# Patient Record
Sex: MALE | Race: WHITE | NOT HISPANIC OR LATINO | Employment: OTHER | URBAN - METROPOLITAN AREA
[De-identification: names, ages, dates, MRNs, and addresses within clinical notes are randomized per-mention and may not be internally consistent; named-entity substitution may affect disease eponyms.]

---

## 2018-05-11 ENCOUNTER — OFFICE VISIT (OUTPATIENT)
Dept: FAMILY MEDICINE | Facility: CLINIC | Age: 58
End: 2018-05-11
Payer: COMMERCIAL

## 2018-05-11 VITALS
SYSTOLIC BLOOD PRESSURE: 160 MMHG | BODY MASS INDEX: 28.41 KG/M2 | OXYGEN SATURATION: 99 % | HEART RATE: 69 BPM | TEMPERATURE: 97 F | WEIGHT: 238 LBS | DIASTOLIC BLOOD PRESSURE: 112 MMHG

## 2018-05-11 DIAGNOSIS — I10 BENIGN ESSENTIAL HYPERTENSION: Primary | ICD-10-CM

## 2018-05-11 DIAGNOSIS — J06.9 UPPER RESPIRATORY TRACT INFECTION, UNSPECIFIED TYPE: ICD-10-CM

## 2018-05-11 PROCEDURE — 99203 OFFICE O/P NEW LOW 30 MIN: CPT | Performed by: INTERNAL MEDICINE

## 2018-05-11 RX ORDER — HYDROCHLOROTHIAZIDE 25 MG/1
25 TABLET ORAL DAILY
Qty: 30 TABLET | Refills: 1 | Status: SHIPPED | OUTPATIENT
Start: 2018-05-11 | End: 2018-06-11

## 2018-05-11 NOTE — MR AVS SNAPSHOT
"              After Visit Summary   5/11/2018    Cristian Antonio    MRN: [id number]           Patient Information     Date Of Birth          1960        Visit Information        Provider Department      5/11/2018 2:00 PM Slime Rausch MD; [facility]        Today's Diagnoses     Benign essential hypertension    -  1      Care Instructions    Start hydrochlorothiazide once daily.      I will see you in one month.      Please get fasting labs checked a few days before the visit with me.           Follow-ups after your visit        Follow-up notes from your care team     Return in about 1 month (around 6/11/2018) for Hypertension.      Who to contact     If you have questions or need follow up information about today's clinic visit or your schedule please contact [facility] [place] directly at [phone].  Normal or non-critical lab and imaging results will be communicated to you by MyChart, letter or phone within 4 business days after the clinic has received the results. If you do not hear from us within 7 days, please contact the clinic through MyChart or phone. If you have a critical or abnormal lab result, we will notify you by phone as soon as possible.  Submit refill requests through SDI or call your pharmacy and they will forward the refill request to us. Please allow 3 business days for your refill to be completed.          Additional Information About Your Visit        MyChart Information     SDI lets you send messages to your doctor, view your test results, renew your prescriptions, schedule appointments and more. To sign up, go to [email or web address] . Click on \"Log in\" on the left side of the screen, which will take you to the Welcome page. Then click on \"Sign up Now\" on the right side of the page.     You will be asked to enter the access code listed below, as well as some personal information. Please follow the directions to create your " username and password.     Your access code is: [email or web address]  Expires: 2018  2:16 PM     Your access code will  in 90 days. If you need help or a new code, please call your [place] clinic or [phone].        Care EveryWhere ID     This is your Care EveryWhere ID. This could be used by other organizations to access your [place] medical records  [id number]        Your Vitals Were     Pulse Temperature Pulse Oximetry BMI (Body Mass Index)          69 97  F (36.1  C) (Tympanic) 99% 28.41 kg/m2         Blood Pressure from Last 3 Encounters:   18 (!) 180/112   16 (!) 154/94   14 134/85    Weight from Last 3 Encounters:   18 238 lb (108 kg)   16 245 lb (111.1 kg)   14 246 lb (111.6 kg)              Today, you had the following     No orders found for display         Today's Medication Changes          These changes are accurate as of 18  2:16 PM.  If you have any questions, ask your nurse or doctor.               Start taking these medicines.        Dose/Directions    hydrochlorothiazide 25 MG tablet   Commonly known as:  HYDRODIURIL   Used for:  Benign essential hypertension   Started by:  Slime Rausch MD        Dose:  25 mg   Take 1 tablet (25 mg) by mouth daily   Quantity:  30 tablet   Refills:  1            Where to get your medicines      These medications were sent to [facility] [place] [place] [address]     Phone:  [phone]     hydrochlorothiazide 25 MG tablet                Primary Care Provider Office Phone # Fax #    Aristeo Gonzalez [phone]       [facility] [place] [address]        Equal Access to Services     JOURDAN VILLANUEVA AH: Brian chung Sogirish, waaxda luqadaha, qaybta kaalmada adeegyada, malik josue. [facility] [phone].    ATENCIÓN: Si habla español, tiene a mendez disposición servicios  abdias de asistencia lingüística. Colt enriquez [phone].    We comply with applicable federal civil rights laws and [place] laws. We do not discriminate on the basis of race, color, national origin, age, disability, sex, sexual orientation, or gender identity.            Thank you!     Thank you for choosing [facility] [place]  for your care. Our goal is always to provide you with excellent care. Hearing back from our patients is one way we can continue to improve our services. Please take a few minutes to complete the written survey that you may receive in the mail after your visit with us. Thank you!             Your Updated Medication List - Protect others around you: Learn how to safely use, store and throw away your medicines at www.disposemymeds.org.          This list is accurate as of 5/11/18  2:16 PM.  Always use your most recent med list.                   Brand Name Dispense Instructions for use Diagnosis    hydrochlorothiazide 25 MG tablet    HYDRODIURIL    30 tablet    Take 1 tablet (25 mg) by mouth daily    Benign essential hypertension

## 2018-05-11 NOTE — PROGRESS NOTES
SUBJECTIVE:   Cristian Antonio is a 57 year old male who presents to clinic today for the following health issues:      Acute Illness   Acute illness concerns: cough   Onset:  A week     Fever: no    Chills/Sweats: no    Headache (location?): YES    Sinus Pressure:YES    Conjunctivitis:  no    Ear Pain: no    Rhinorrhea: YES    Congestion: YES    Sore Throat: no     Cough: YES    Wheeze: no    Decreased Appetite: no    Nausea: YES- once     Vomiting: no    Diarrhea:  no    Dysuria/Freq.: no    Fatigue/Achiness: no    Sick/Strep Exposure: YES- wife     Therapies Tried and outcome: juliana Foster is here at the advice of his wife, who I saw earlier today, for URI symptoms.  He reports a nasty cough a week ago but it is much better now.  He has some sinus congestion and rhinorrhea in the morning, but that improves throughout the day.  He feels like overall his illnesses on the mend.      His blood pressure is markedly elevated today.  He has not been in for regular checkup in a while.  He does not check his blood pressure at the grocery store etc.  He denies any chest pain, headaches, palpitations or shortness of breath.  He does snore, but denies unrefreshing sleep.    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  He is otherwise healthy.        Reviewed and updated as needed this visit by clinical staff  Tobacco  Allergies  Meds       Reviewed and updated as needed this visit by Provider         ROS:  Constitutional, HEENT, cardiovascular, pulmonary, gi and gu systems are negative, except as otherwise noted.    OBJECTIVE:     BP (!) 160/112 (BP Location: Left arm, Patient Position: Chair, Cuff Size: Adult Regular)  Pulse 69  Temp 97  F (36.1  C) (Tympanic)  Wt 238 lb (108 kg)  SpO2 99%  BMI 28.41 kg/m2  Body mass index is 28.41 kg/(m^2).    Gen: well appearing, pleasant gentleman, no distress  HEENT: PERRL, no conjunctival injection, crowded oropharynx, no posterior pharynx erythema, MMM.  TM normal  b/l.     Neck: supple, no LAD  Pulm: breathing comfortably, CTAB, no wheezes or rales  CV: RRR, normal S1 and S2, no murmurs          ASSESSMENT/PLAN:       1. Benign essential hypertension  Cristian has very elevated blood pressure today.  There are several other elevated readings in the few times he has been here in the past 10 years.  I think it would be reasonable to start a medication today rather than have him back to repeat BP.  He is in agreement with this.  Reviewed common side effects, administration instructions.  Based on his exam and snoring, may need to consider AMY as contributing in the future.    - hydrochlorothiazide (HYDRODIURIL) 25 MG tablet; Take 1 tablet (25 mg) by mouth daily  Dispense: 30 tablet; Refill: 1    2. Upper respiratory tract infection, unspecified type  Exam is reassuring, he is feeling improved. Continue supportive care.        Follow up for HTN in one month.  I have asked him to get fasting labs a few days before the appointment so we can review them at the visit.       Slime Rausch MD  [facility]

## 2018-05-11 NOTE — PATIENT INSTRUCTIONS
Start hydrochlorothiazide once daily.      I will see you in one month.      Please get fasting labs checked a few days before the visit with me.

## 2018-06-11 ENCOUNTER — OFFICE VISIT (OUTPATIENT)
Dept: FAMILY MEDICINE | Facility: CLINIC | Age: 58
End: 2018-06-11
Payer: COMMERCIAL

## 2018-06-11 DIAGNOSIS — Z12.11 SCREEN FOR COLON CANCER: Primary | ICD-10-CM

## 2018-06-11 DIAGNOSIS — I10 BENIGN ESSENTIAL HYPERTENSION: ICD-10-CM

## 2018-06-11 PROCEDURE — 36415 COLL VENOUS BLD VENIPUNCTURE: CPT | Performed by: INTERNAL MEDICINE

## 2018-06-11 PROCEDURE — 80061 LIPID PANEL: CPT | Performed by: INTERNAL MEDICINE

## 2018-06-11 PROCEDURE — 99213 OFFICE O/P EST LOW 20 MIN: CPT | Performed by: INTERNAL MEDICINE

## 2018-06-11 PROCEDURE — 80048 BASIC METABOLIC PNL TOTAL CA: CPT | Performed by: INTERNAL MEDICINE

## 2018-06-11 RX ORDER — HYDROCHLOROTHIAZIDE 25 MG/1
25 TABLET ORAL DAILY
Qty: 90 TABLET | Refills: 1 | Status: SHIPPED | OUTPATIENT
Start: 2018-06-11 | End: 2019-01-07

## 2018-06-11 ASSESSMENT — MIFFLIN-ST. JEOR: SCORE: 1998.9

## 2018-06-11 NOTE — PROGRESS NOTES
SUBJECTIVE:   Cristian Antonio is a 57 year old male who presents to clinic today for the following health issues:      Hypertension Follow-up      Outpatient blood pressures are not being checked. Checked it at work once and top number was 119, but he thought that was probably a mistake     Low Salt Diet: no added salt      Amount of exercise or physical activity: 6-7 days/week for an average of 4-5 hrs     Problems taking medications regularly: No    Medication side effects: none    Diet: regular (no restrictions)    We started hctz a month ago for HTN.  He is tolerating this medication well without side effects, taking it consistently.  Denies CP, headaches, palpitations, SOB.          Reviewed and updated as needed this visit by clinical staff  Tobacco  Allergies  Meds  Med Hx  Surg Hx  Fam Hx  Soc Hx      Reviewed and updated as needed this visit by Provider  Tobacco  Med Hx  Surg Hx  Fam Hx  Soc Hx        ROS:  CV, pulm, neuro reviewed,  otherwise negative unless noted above.       OBJECTIVE:     BP (!) 126/94  Pulse 68  Temp 97.7  F (36.5  C) (Tympanic)  Wt 236 lb (107 kg)  BMI 28.17 kg/m2  Body mass index is 28.17 kg/(m^2).     Repeat 126/94    Gen: well appearing, pleasant gentleman, no distress  Pulm: breathing comfortably, CTAB, no wheezes or rales  CV: RRR, normal S1 and S2, no murmurs  Ext: no LE edema       ASSESSMENT/PLAN:       1. Benign essential hypertension  Systolic well controlled, diastolic is still high.  Recommended he monitor this at home, if diastolic consistently > 90 need to see him back sooner.  Will check lipids and BMP today, he is fasting   - hydrochlorothiazide (HYDRODIURIL) 25 MG tablet; Take 1 tablet (25 mg) by mouth daily  Dispense: 90 tablet; Refill: 1  - **Basic metabolic panel FUTURE anytime  - Lipid panel reflex to direct LDL Fasting      2. Screen for colon cancer  Discussed FIT test vs colonoscopy, he would like to proceed with colonoscopy   - GASTROENTEROLOGY  ADULT REF PROCEDURE ONLY      Follow up 3 months, sooner as noted above.     Slime Rausch MD  [facility]

## 2018-06-11 NOTE — MR AVS SNAPSHOT
After Visit Summary   6/11/2018    Cristian Antonio    MRN: [id number]           Patient Information     Date Of Birth          1960        Visit Information        Provider Department      6/11/2018 10:00 AM Slime Rausch MD; [facility] [place]        Today's Diagnoses     Screen for colon cancer    -  1    Benign essential hypertension          Care Instructions    Check your blood pressure a couple times per week.  The goal is < 120/80.      If the bottom number is consistently > 90, please come back sooner than 3 months.           Follow-ups after your visit        Additional Services     GASTROENTEROLOGY ADULT REF PROCEDURE ONLY       Last Lab Result: Creatinine (mg/dL)       Date                     Value                 01/31/2016               1.01             ----------  Body mass index is 28.17 kg/(m^2).     Needed:  Yes  Language:  American Sign Language    Patient will be contacted to schedule procedure.     Please be aware that coverage of these services is subject to the terms and limitations of your health insurance plan.  Call member services at your health plan with any benefit or coverage questions.  Any procedures must be performed at a [facility] facility OR coordinated by your clinic's referral office.    Please bring the following with you to your appointment:    (1) Any X-Rays, CTs or MRIs which have been performed.  Contact the facility where they were done to arrange for  prior to your scheduled appointment.    (2) List of current medications   (3) This referral request   (4) Any documents/labs given to you for this referral                  Follow-up notes from your care team     Return in about 3 months (around 9/11/2018) for Hypertension.      Who to contact     If you have questions or need follow up information about today's clinic visit or your schedule please contact [facility] [place] directly at  "[phone].  Normal or non-critical lab and imaging results will be communicated to you by MyChart, letter or phone within 4 business days after the clinic has received the results. If you do not hear from us within 7 days, please contact the clinic through Goodwallhart or phone. If you have a critical or abnormal lab result, we will notify you by phone as soon as possible.  Submit refill requests through [email or web address] or call your pharmacy and they will forward the refill request to us. Please allow 3 business days for your refill to be completed.          Additional Information About Your Visit        Goodwallhart Information     [email or web address] lets you send messages to your doctor, view your test results, renew your prescriptions, schedule appointments and more. To sign up, go to [email or web address] . Click on \"Log in\" on the left side of the screen, which will take you to the Welcome page. Then click on \"Sign up Now\" on the right side of the page.     You will be asked to enter the access code listed below, as well as some personal information. Please follow the directions to create your username and password.     Your access code is: [email or web address]  Expires: 2018  2:16 PM     Your access code will  in 90 days. If you need help or a new code, please call your [place] clinic or [phone].        Care EveryWhere ID     This is your Care EveryWhere ID. This could be used by other organizations to access your [place] medical records  [id number]        Your Vitals Were     Pulse Temperature BMI (Body Mass Index)             68 97.7  F (36.5  C) (Tympanic) 28.17 kg/m2          Blood Pressure from Last 3 Encounters:   18 140/90   18 (!) 160/112   16 (!) 154/94    Weight from Last 3 Encounters:   18 236 lb (107 kg)   18 238 lb (108 kg)   16 245 lb (111.1 kg)              We Performed the Following     GASTROENTEROLOGY ADULT REF PROCEDURE ONLY          Where to get your medicines    "   These medications were sent to [facility] [place] - [address]  [address], [place] [address]     Phone:  [phone]     hydrochlorothiazide 25 MG tablet          Primary Care Provider Office Phone # Fax #    Slime Rausch -[phone]       [address]  [place] [address]        Equal Access to Services     SUZY VILLANUEVA : Hadii aad ku hadasho Soomaali, waaxda luqadaha, qaybta kaalmada adeegyada, waxay idiin hayaan adeeg kharash la'gabino . So [facility] [phone].    ATENCIÓN: Si habla español, tiene a mendez disposición servicios gratuitos de asistencia lingüística. [facility] [phone].    We comply with applicable federal civil rights laws and [place] laws. We do not discriminate on the basis of race, color, national origin, age, disability, sex, sexual orientation, or gender identity.            Thank you!     Thank you for choosing [facility]  for your [place]. Our goal is always to provide you with excellent care. Hearing back from our patients is one way we can continue to improve our services. Please take a few minutes to complete the written survey that you may receive in the mail after your visit with us. Thank you!             Your Updated Medication List - Protect others around you: Learn how to safely use, store and throw away your medicines at www.disposemymeds.org.          This list is accurate as of 6/11/18 10:15 AM.  Always use your most recent med list.                   Brand Name Dispense Instructions for use Diagnosis    hydrochlorothiazide 25 MG tablet    HYDRODIURIL    90 tablet    Take 1 tablet (25 mg) by mouth daily    Benign essential hypertension

## 2018-06-11 NOTE — PATIENT INSTRUCTIONS
Check your blood pressure a couple times per week.  The goal is < 120/80.      If the bottom number is consistently > 90, please come back sooner than 3 months.

## 2018-06-11 NOTE — LETTER
June 13, 2018      Cristian Antonio  [address] [facility] [address]        Dear ,    We are writing to inform you of your test results.    Labs look good.  Your kidney function and electrolytes are normal.  Your blood sugar was in the normal range as well.  Cholesterol numbers look great, no need for any medications.    Resulted Orders   **Basic metabolic panel FUTURE anytime   Result Value Ref Range    Sodium 142 133 - 144 mmol/L    Potassium 3.8 3.4 - 5.3 mmol/L    Chloride 108 94 - 109 mmol/L    Carbon Dioxide 21 20 - 32 mmol/L    Anion Gap 13 3 - 14 mmol/L    Glucose 98 70 - 99 mg/dL      Comment:      Fasting specimen    Urea Nitrogen 18 7 - 30 mg/dL    Creatinine 0.99 0.66 - 1.25 mg/dL    GFR Estimate 77 >60 mL/min/1.7m2      Comment:      Non  GFR Calc    GFR Estimate If Black >90 >60 mL/min/1.7m2      Comment:       GFR Calc    Calcium 9.4 8.5 - 10.1 mg/dL   Lipid panel reflex to direct LDL Fasting   Result Value Ref Range    Cholesterol 140 <200 mg/dL    Triglycerides 59 <150 mg/dL      Comment:      Fasting specimen    HDL Cholesterol 45 >39 mg/dL    LDL Cholesterol Calculated 83 <100 mg/dL      Comment:      Desirable:       <100 mg/dl    Non HDL Cholesterol 95 <130 mg/dL       If you have any questions or concerns, please call the clinic at the number listed above.       Sincerely,        Slime Rausch MD

## 2018-06-13 LAB
ANION GAP SERPL CALCULATED.3IONS-SCNC: 13 MMOL/L (ref 3–14)
BUN SERPL-MCNC: 18 MG/DL (ref 7–30)
CALCIUM SERPL-MCNC: 9.4 MG/DL (ref 8.5–10.1)
CHLORIDE SERPL-SCNC: 108 MMOL/L (ref 94–109)
CHOLEST SERPL-MCNC: 140 MG/DL
CO2 SERPL-SCNC: 21 MMOL/L (ref 20–32)
CREAT SERPL-MCNC: 0.99 MG/DL (ref 0.66–1.25)
GFR SERPL CREATININE-BSD FRML MDRD: 77 ML/MIN/1.7M2
GLUCOSE SERPL-MCNC: 98 MG/DL (ref 70–99)
HDLC SERPL-MCNC: 45 MG/DL
LDLC SERPL CALC-MCNC: 83 MG/DL
NONHDLC SERPL-MCNC: 95 MG/DL
POTASSIUM SERPL-SCNC: 3.8 MMOL/L (ref 3.4–5.3)
SODIUM SERPL-SCNC: 142 MMOL/L (ref 133–144)
TRIGL SERPL-MCNC: 59 MG/DL

## 2019-01-07 DIAGNOSIS — I10 BENIGN ESSENTIAL HYPERTENSION: ICD-10-CM

## 2019-01-07 NOTE — TELEPHONE ENCOUNTER
"Requested Prescriptions   Pending Prescriptions Disp Refills     hydrochlorothiazide (HYDRODIURIL) 25 MG tablet [Pharmacy Med Name: HYDROCHLOROTHIAZIDE 25MG TABLETS] 30 tablet 0     Sig: TAKE 1 TABLET BY MOUTH DAILY    Diuretics (Including Combos) Protocol Failed - 1/7/2019 12:38 PM       Failed - Blood pressure under 140/90 in past 12 months    BP Readings from Last 3 Encounters:   06/11/18 (!) 126/94   05/11/18 (!) 160/112   01/31/16 (!) 154/94            Last Written Prescription Date:  6/11/2018  Last Fill Quantity: 90,  # refills: 1   Last office visit: 6/11/2018 with prescribing provider:  KELVIN Rausch  Future Office Visit:        Passed - Recent (12 mo) or future (30 days) visit within the authorizing provider's specialty    Patient had office visit in the last 12 months or has a visit in the next 30 days with authorizing provider or within the authorizing provider's specialty.  See \"Patient Info\" tab in inbasket, or \"Choose Columns\" in Meds & Orders section of the refill encounter.             Passed - Medication is active on med list       Passed - Patient is age 18 or older       Passed - Normal serum creatinine on file in past 12 months    Recent Labs   Lab Test 06/11/18  1017   CR 0.99             Passed - Normal serum potassium on file in past 12 months    Recent Labs   Lab Test 06/11/18  1017   POTASSIUM 3.8                   Passed - Normal serum sodium on file in past 12 months    Recent Labs   Lab Test 06/11/18  1017                   "

## 2019-01-07 NOTE — LETTER
[facility]  [address] [address]  [phone]        January 16, 2019  Cristian Antonio  [address] [place] [id number]    Dear Cristian,    I care about your health and have reviewed your health plan. I have reviewed your medical conditions, medication list, and lab results and am making recommendations based on this review, to better manage your health.    You are in particular need of attention regarding:  -High Blood Pressure    I am recommending that you:  Schedule an office visit for a BP check    Here is a list of Health Maintenance topics that are due now or due soon:  Health Maintenance Due   Topic Date Due     Depression Assessment 2 - yearly  06/26/1971     HIV SCREEN (SYSTEM ASSIGNED)  06/26/1977     Hepatitis C Screening  06/26/1977     Diptheria Tetanus Pertussis (DTAP/TDAP/TD) Vaccine (1 - Tdap) 06/26/1984     Colon Cancer Screening - every 10 years.  06/26/2009     Zoster (Chicken Pox) Vaccine (1 of 2) 06/26/2009     Discuss Advance Directive Planning  06/26/2014     Flu Vaccine (1) 09/01/2018       Please call us at [phone] (or use [employer]) to address the above recommendations.     Thank you for trusting [facility] and we appreciate the opportunity to serve you.  We look forward to supporting your healthcare needs in the future.    Healthy Regards,    Slime Rausch MD

## 2019-01-08 RX ORDER — HYDROCHLOROTHIAZIDE 25 MG/1
TABLET ORAL
Qty: 30 TABLET | Refills: 0 | Status: SHIPPED | OUTPATIENT
Start: 2019-01-08 | End: 2020-12-25

## 2019-06-25 VITALS
HEART RATE: 68 BPM | DIASTOLIC BLOOD PRESSURE: 94 MMHG | SYSTOLIC BLOOD PRESSURE: 126 MMHG | TEMPERATURE: 97.7 F | BODY MASS INDEX: 27.87 KG/M2 | HEIGHT: 77 IN | WEIGHT: 236 LBS

## 2020-12-25 ENCOUNTER — APPOINTMENT (OUTPATIENT)
Dept: GENERAL RADIOLOGY | Facility: CLINIC | Age: 60
End: 2020-12-25
Attending: EMERGENCY MEDICINE
Payer: COMMERCIAL

## 2020-12-25 ENCOUNTER — HOSPITAL ENCOUNTER (EMERGENCY)
Facility: CLINIC | Age: 60
Discharge: HOME | End: 2020-12-25
Attending: EMERGENCY MEDICINE | Admitting: EMERGENCY MEDICINE
Payer: COMMERCIAL

## 2020-12-25 VITALS
DIASTOLIC BLOOD PRESSURE: 113 MMHG | RESPIRATION RATE: 16 BRPM | TEMPERATURE: 99.6 F | OXYGEN SATURATION: 97 % | HEIGHT: 76 IN | WEIGHT: 245 LBS | HEART RATE: 76 BPM | BODY MASS INDEX: 29.83 KG/M2 | SYSTOLIC BLOOD PRESSURE: 153 MMHG

## 2020-12-25 DIAGNOSIS — J06.9 VIRAL UPPER RESPIRATORY TRACT INFECTION: ICD-10-CM

## 2020-12-25 DIAGNOSIS — Z20.822 SUSPECTED COVID-19 VIRUS INFECTION: ICD-10-CM

## 2020-12-25 LAB
ALBUMIN UR-MCNC: 30 MG/DL
ANION GAP SERPL CALCULATED.3IONS-SCNC: 6 MMOL/L (ref 3–14)
APPEARANCE UR: CLEAR
BACTERIA #/AREA URNS HPF: ABNORMAL /HPF
BASOPHILS # BLD AUTO: 0 10E9/L (ref 0–0.2)
BASOPHILS NFR BLD AUTO: 0.2 %
BILIRUB UR QL STRIP: NEGATIVE
BUN SERPL-MCNC: 16 MG/DL (ref 7–30)
CALCIUM SERPL-MCNC: 8.8 MG/DL (ref 8.5–10.1)
CHLORIDE SERPL-SCNC: 108 MMOL/L (ref 94–109)
CO2 SERPL-SCNC: 21 MMOL/L (ref 20–32)
COLOR UR AUTO: YELLOW
CREAT SERPL-MCNC: 1.07 MG/DL (ref 0.66–1.25)
DIFFERENTIAL METHOD BLD: NORMAL
EOSINOPHIL # BLD AUTO: 0 10E9/L (ref 0–0.7)
EOSINOPHIL NFR BLD AUTO: 0 %
ERYTHROCYTE [DISTWIDTH] IN BLOOD BY AUTOMATED COUNT: 12.3 % (ref 10–15)
FLUABV+SARS-COV-2+RSV PNL RESP NAA+PROBE: NEGATIVE
FLUABV+SARS-COV-2+RSV PNL RESP NAA+PROBE: NEGATIVE
GFR SERPL CREATININE-BSD FRML MDRD: 74 ML/MIN/{1.73_M2}
GLUCOSE SERPL-MCNC: 103 MG/DL (ref 70–99)
GLUCOSE UR STRIP-MCNC: NEGATIVE MG/DL
HCT VFR BLD AUTO: 48.4 % (ref 40–53)
HGB BLD-MCNC: 17.3 G/DL (ref 13.3–17.7)
HGB UR QL STRIP: ABNORMAL
HYALINE CASTS #/AREA URNS LPF: 7 /LPF (ref 0–2)
IMM GRANULOCYTES # BLD: 0 10E9/L (ref 0–0.4)
IMM GRANULOCYTES NFR BLD: 0.4 %
KETONES UR STRIP-MCNC: 40 MG/DL
LABORATORY COMMENT REPORT: ABNORMAL
LEUKOCYTE ESTERASE UR QL STRIP: NEGATIVE
LYMPHOCYTES # BLD AUTO: 0.8 10E9/L (ref 0.8–5.3)
LYMPHOCYTES NFR BLD AUTO: 17.2 %
MCH RBC QN AUTO: 29.6 PG (ref 26.5–33)
MCHC RBC AUTO-ENTMCNC: 35.7 G/DL (ref 31.5–36.5)
MCV RBC AUTO: 83 FL (ref 78–100)
MONOCYTES # BLD AUTO: 0.8 10E9/L (ref 0–1.3)
MONOCYTES NFR BLD AUTO: 15.9 %
MUCOUS THREADS #/AREA URNS LPF: PRESENT /LPF
NEUTROPHILS # BLD AUTO: 3.1 10E9/L (ref 1.6–8.3)
NEUTROPHILS NFR BLD AUTO: 66.3 %
NITRATE UR QL: NEGATIVE
NRBC # BLD AUTO: 0 10*3/UL
NRBC BLD AUTO-RTO: 0 /100
PH UR STRIP: 5.5 PH (ref 5–7)
PLATELET # BLD AUTO: 177 10E9/L (ref 150–450)
POTASSIUM SERPL-SCNC: 3.8 MMOL/L (ref 3.4–5.3)
RBC # BLD AUTO: 5.85 10E12/L (ref 4.4–5.9)
RBC #/AREA URNS AUTO: 1 /HPF (ref 0–2)
RSV RNA SPEC QL NAA+PROBE: ABNORMAL
SARS-COV-2 RNA SPEC QL NAA+PROBE: POSITIVE
SODIUM SERPL-SCNC: 135 MMOL/L (ref 133–144)
SOURCE: ABNORMAL
SP GR UR STRIP: 1.03 (ref 1–1.03)
SPECIMEN SOURCE: ABNORMAL
SQUAMOUS #/AREA URNS AUTO: <1 /HPF (ref 0–1)
UROBILINOGEN UR STRIP-MCNC: NORMAL MG/DL (ref 0–2)
WBC # BLD AUTO: 4.7 10E9/L (ref 4–11)
WBC #/AREA URNS AUTO: 2 /HPF (ref 0–5)

## 2020-12-25 PROCEDURE — 85025 COMPLETE CBC W/AUTO DIFF WBC: CPT | Performed by: EMERGENCY MEDICINE

## 2020-12-25 PROCEDURE — 81001 URINALYSIS AUTO W/SCOPE: CPT | Performed by: EMERGENCY MEDICINE

## 2020-12-25 PROCEDURE — 250N000013 HC RX MED GY IP 250 OP 250 PS 637: Performed by: EMERGENCY MEDICINE

## 2020-12-25 PROCEDURE — 71045 X-RAY EXAM CHEST 1 VIEW: CPT

## 2020-12-25 PROCEDURE — 99284 EMERGENCY DEPT VISIT MOD MDM: CPT | Mod: 25

## 2020-12-25 PROCEDURE — C9803 HOPD COVID-19 SPEC COLLECT: HCPCS

## 2020-12-25 PROCEDURE — 80048 BASIC METABOLIC PNL TOTAL CA: CPT | Performed by: EMERGENCY MEDICINE

## 2020-12-25 PROCEDURE — 87636 SARSCOV2 & INF A&B AMP PRB: CPT | Performed by: EMERGENCY MEDICINE

## 2020-12-25 RX ORDER — ACETAMINOPHEN 500 MG
1000 TABLET ORAL ONCE
Status: COMPLETED | OUTPATIENT
Start: 2020-12-25 | End: 2020-12-25

## 2020-12-25 RX ADMIN — ACETAMINOPHEN 1000 MG: 500 TABLET, FILM COATED ORAL at 11:30

## 2020-12-25 ASSESSMENT — MIFFLIN-ST. JEOR: SCORE: 2017.81

## 2020-12-25 NOTE — ED AVS SNAPSHOT
[facility] Emergency Dept  [address] [facility] [address]  Phone: [phone]  Fax: [phone]                                    Cristian Antonio   MRN: [id number]    Department: [facility] Emergency Dept   Date of Visit: 12/25/2020           After Visit Summary Signature Page    I have received my discharge instructions, and my questions have been answered. I have discussed any challenges I see with this plan with the nurse or doctor.    ..........................................................................................................................................  Patient/Patient Representative Signature      ..........................................................................................................................................  Patient Representative Print Name and Relationship to Patient    ..................................................               ................................................  Date                                   Time    ..........................................................................................................................................  Reviewed by Signature/Title    ...................................................              ..............................................  Date                                               Time          22EPIC Rev 08/18

## 2020-12-25 NOTE — ED TRIAGE NOTES
States felt fever/chills started last night (did not take temperature) - presents today feeling fatigue and generalized weakness.  Uses .

## 2020-12-25 NOTE — LETTER
December 26, 2020      Cristian Antonio  [address] [facility] [address]               SARS-CoV-2 PCR Result (no units)   Date Value   12/25/2020 POSITIVE (AA)       This letter provides a written record that you were tested for COVID-19. Your result was positive. This means you have COVID-19 (coronavirus).    How can I protect others?If you have symptoms, stay home and away from others (self-isolate) until:You ve had no fever--and no medicine that reduces fever--for 1 full day (24 hours). And      Your other symptoms have gotten better. For example, your cough or breathing has improved. And     At least 10 days have passed since your symptoms started. (If you've been told by a doctor that you have a weak immune system, wait 20 days).    If you don t have symptoms: Stay home and away from others (self-isolate) until at least 10 days have passed since your first positive COVID-19 test. If you have a weak immune system, please self-isolate for 20 days.    During this time:    Stay in your own room, including for meals. Use your own bathroom if you can.    Stay away from others in your home. No hugging, kissing or shaking hands. No visitors.     Don t go to work, school or anywhere else.     Clean  high touch  surfaces often (doorknobs, counters, handles, etc.). Use a household cleaning spray or wipes. You ll find a full list on the EPA website at www.epa.gov/pesticide-registration/list-n-disinfectants-use-against-sars-cov-2.     Cover your mouth and nose with a mask or other face covering to avoid spreading germs.    Wash your hands and face often with soap and water.    Caregivers in these groups are at risk for severe illness due to COVID-19:  o People 65 years and older  o People who live in a nursing home or long-term care facility  o People with chronic disease (lung, heart, cancer, diabetes, kidney, liver, immunologic)  o People who have a weakened immune system, including those who:  - Are in cancer  treatment  - Take medicine that weakens the immune system, such as corticosteroids  - Had a bone marrow or organ transplant  - Have an immune deficiency  - Have poorly controlled HIV or AIDS  - Are obese (body mass index of 40 or higher)  - Smoke regularly    Caregivers should wear gloves while washing dishes, handling laundry and cleaning bedrooms and bathrooms.    Wash and dry laundry with special caution. Don t shake dirty laundry, and use the warmest water setting you can.    If you have a weakened immune system, ask your doctor about other actions you should take.    For more tips, go to www.cdc.gov/coronavirus/2019-ncov/downloads/10Things.pdf.    You should not go back to work until you meet the guidelines above for ending your home isolation. You don't need to be retested for COVID-19 before going back to work- studies show that you won't spread the virus if it's been at least 10 days since your symptoms started (or 20 days, if you have a weak immune system).    Employers: This document serves as formal notice of your employee s medical guidelines for going back to work. They must meet the above guidelines before going back to work in person.    How can I take care of myself?    1. Get lots of rest. Drink extra fluids (unless a doctor has told you not to).    2. Take Tylenol (acetaminophen) for fever or pain. If you have liver or kidney problems, ask your family doctor if it s okay to take Tylenol.     Take either:     650 mg (two 325 mg pills) every 4 to 6 hours, or     1,000 mg (two 500 mg pills) every 8 hours as needed.     Note: Don t take more than 3,000 mg in one day. Acetaminophen is found in many medicines (both prescribed and over-the-counter medicines). Read all labels to be sure you don t take too much.    For children, check the Tylenol bottle for the right dose (based on their age or weight).    3. If you have other health problems (like cancer, heart failure, an organ transplant or severe kidney  disease): Call your specialty clinic if you don t feel better in the next 2 days.    4. Know when to call 911: Emergency warning signs include:    Trouble breathing or shortness of breath    Pain or pressure in the chest that doesn t go away    Feeling confused like you haven t felt before, or not being able to wake up    Bluish-colored lips or face    5. Sign up for [employer]. We know it s scary to hear that you have COVID-19. We want to track your symptoms to make sure you re okay over the next 2 weeks. Please look for an email from [employer]--this is a free, online program that we ll use to keep in touch. To sign up, follow the link in the email. Learn more at [email or web address].      Where can I get more information?    [facility]: [email or web address]    Coronavirus Basics: www.health.[facility].[place]/diseases/coronavirus/basics.html    What to Do If You re Sick: www.cdc.gov/coronavirus/2019-ncov/about/steps-when-sick.html    Ending Home Isolation: www.cdc.gov/coronavirus/2019-ncov/hcp/disposition-in-home-patients.html     Caring for Someone with COVID-19: www.cdc.gov/coronavirus/2019-ncov/if-you-are-sick/care-for-someone.html     [facility] clinical trials (COVID-19 research studies): [email or web address].[email or web address]-trials

## 2020-12-25 NOTE — DISCHARGE INSTRUCTIONS
Please return to the ED if you have worsening cough, fevers >101, chest pain, shortness of breath, intractable vomiting, or other acute changes.  Please follow up with your PCP in the next 2-3 days.      Use tylenol for pain/fever.  Drink plenty of fluids and rest.      Discharge Instructions  Upper Respiratory Infection    The upper respiratory tract includes the sinuses, nasal passages, pharynx, and larynx. A URI, or upper respiratory infection, is an infection of any of the parts of the upper airway. Symptoms include runny nose, congestion, sneezing, sore throat, cough, and fever. URIs are almost always caused by a virus. Antibiotics do not help with viral infections, so are generally not prescribed. A URI is very contagious through coughing and nasal secretions; make sure you wash your hands often and clean surfaces after sneezing, coughing or touching them. While you should start to improve in 3 - 5 days, remember that sometimes a cough can linger for several weeks.    Generally, every Emergency Department visit should have a follow-up clinic visit with either a primary or a specialty clinic/provider. Please follow-up as instructed by your emergency provider today.    Return to the Emergency Department if:  Any of your symptoms get much worse.  You seem very sick, like being too weak to get up.  You have chest pain or shortness of breath.   You have a severe headache.  You are vomiting (throwing up) so much you cannot keep fluids or medicines down.  You have confusion or seem unusually drowsy.  You have a seizure.    What can I do to help myself?  Fill any prescriptions the provider gave you and take them right away  If you have a fever, get plenty of rest and drink lots of fluids, especially water.  Using a humidifier or saline nose spray will also help loosen mucous.   Clothes or blankets will not change your fever. Do what is comfortable for you.  Bathing or sponging in lukewarm water may help you feel  better.  Acetaminophen (Tylenol ) or ibuprofen (Advil , Motrin ) will help bring fever down and may help you feel more comfortable. Be sure to read and follow the package directions, and ask your provider if you have questions.  Do not drink alcohol.  Decongestants may help you feel better. You may use decongestant nose sprays Afrin  (oxymetazoline) or Jesse-Synephrine  (phenylephrine hydrochloride) for up to 3 days, or may use a decongestant tablet like Sudafed  (pseudoephedrine).  If you were given a prescription for medicine here today, be sure to read all of the information (including the package insert) that comes with your prescription.  This will include important information about the medicine, its side effects, and any warnings that you need to know about.  The pharmacist who fills the prescription can provide more information and answer questions you may have about the medicine.  If you have questions or concerns that the pharmacist cannot address, please call or return to the Emergency Department.   Remember that you can always come back to the Emergency Department if you are not able to see your regular provider in the amount of time listed above, if you get any new symptoms, or if there is anything that worries you.    Discharge Instructions  COVID-19    COVID-19 is the disease caused by a new coronavirus. The virus spreads from person-to-person primarily by droplets when an infected person coughs or sneezes and the droplet either lands on another person or that other person touches a surface with the droplet on it. There are tests available to diagnose COVID-19. There is no specific treatment or medicine for the disease.    You may have been diagnosed with COVID, may be being tested for COVID and have a pending test result, or may have been exposed to COVID.    Symptoms of COVID-19    Many people have no symptoms or mild symptoms.  Symptoms may usually appear 4 to 5 days (up to 14 days) after contact  with a person with COVID-19. Some people will get severe symptoms and pneumonia. Usual symptoms are:     ? Fever  ? Cough  ? Trouble breathing    Less common symptoms are: Headache, body aches, sore throat, sneezing, diarrhea, loss of taste or smell.    Isolation and Quarantine    You were seen because you have symptoms, had an exposure, or had some other concern about possible COVID. The best way to stop the spread of the virus is to avoid contact with others.  Isolation refers to sick people staying away from people who are not sick. A person in quarantine is limiting activity because they were exposed and are waiting to see if they might become sick.    If you test positive for COVID, you should stay home (isolation) for at least 10 days after your symptoms began, and for 24 hours with no fever and improvement of symptoms--whichever is longer. (Your fever should be gone for 24 hours without using fever-reducing medicine). If you have no symptoms, you should stay home (isolation) for 10 days from the day of the test.    For example, if you have a fever and cough for 6 days, you need to stay home 4 more days with no fever for a total of 10 days. Or, if you have a fever and cough for 10 days, you need to stay home one more day with no fever for a total of 11 days.    If you have a high-risk exposure to COVID (you spent 15 minutes or more within six feet of somebody who has COVID), you should stay home (quarantine) for 14 days. Even if you test negative for COVID, the CDC recommends a 14-day quarantine from the time of your last exposure to that individual. There are options for a shortened (<14 day quarantine) you can review at:    https://www.health.[facility].[place]/diseases/coronavirus/close.html#long    If you have symptoms but a negative test, you should stay at home until you are symptom-free and without fever for 24 hours, using the same judgment you would for when it is safe to return to work/school from strep  throat, influenza, or the common cold. If you worsen, you should consider being re-evaluated.    If you are being tested for COVID and your test is pending, you should stay home until you know your test result.    How should I protect myself and others?    Do not go to work or school. Have a friend or relative do your shopping. Do not use public transportation (bus, train) or ridesharing (Lyft, Uber).    Separate yourself from other people in your home. As much as possible, you should stay in one room and away from other people in your home. Also, use a separate bathroom, if possible. Avoid handling pets or other animals while sick.     Wear a facemask if you need to be around other people and cover your mouth and nose with a tissue when you cough or sneeze.     Avoid sharing personal household items. You should not share dishes, drinking glasses, forks/knives/spoons, towels, or bedding with other people in your home. After using these items, they should be washed with soap and water. Clean parts of your home that are touched often (doorknobs, faucets, countertops, etc.) daily.     Wash your hands often with soap and water for at least 20 seconds or use an alcohol-based hand  containing at least 60% alcohol.     Avoid touching your face.    Treat your symptoms. You can take Acetaminophen (Tylenol) to treat body aches and fever as needed for comfort. Ibuprofen (Advil or Motrin) can be used as well if you still have symptoms after taking Tylenol. Drink fluids. Rest.    Watch for worsening symptoms such as shortness of breath/difficulty breathing or very severe weakness.    Employers/workplaces are being asked by the Centers for Disease Control (CDC) to not request notes/documentation for you to return to work or prove that you were ill. You may choose to show your employer this paperwork. Also, repeat testing should not be required to return to work.    Return to the Emergency Department if:    If you are  developing worsening breathing, shortness of breath, or feel worse you should seek medical attention.  If you are uncertain, contact your health care provider/clinic. If you need emergency medical attention, call 911 and tell them you have been ill.

## 2020-12-25 NOTE — ED PROVIDER NOTES
"  History   Chief Complaint:  Generalized Weakness     The history is provided by the patient. The history is limited by a language barrier. A  was used.      Cristian Antonio is a 60 year old male with history of hypertension and deafness who presents with generalized weakness. The patient notes that his weakness and fatigue started yesterday. He also notes that he has a headache. He reports that he has had fevers, chills, and hot and cold sweats. He notes that he had some diarrhea without blood. He denies rashes bruising, allergic reaction. He denies recent smoking. He reports that he drinks but does not do drugs. He reports that his family is not sick or recent illnesses. He denies recent exposures to COVID. 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 He notes that he has felt like this in the past. He denies any medications at home.     Review of Systems   All other systems reviewed and are negative.      Allergies:  Banana  Ibuprofen    Medications:  Hydrochlorothiazide     Past Medical History:    Hypertension    Deaf    Past Surgical History:    The patient does not have any pertinent past surgical history.     Family History:    The patient denies past family history.     Social History:  Smoking status: No  Alcohol use: Yes  Drug use: no  PCP: Slime Rausch  Presents to the ED alone  Patient is deaf    Physical Exam     Patient Vitals for the past 24 hrs:   BP Temp Temp src Pulse Resp SpO2 Height Weight   12/25/20 1315 (!) 153/113 -- -- 76 -- 97 % -- --   12/25/20 1218 (!) 156/123 -- -- -- -- 97 % -- --   12/25/20 1215 (!) 156/123 -- -- 80 -- 98 % -- --   12/25/20 1200 -- -- -- -- -- 96 % -- --   12/25/20 1106 -- 99.6  F (37.6  C) Oral -- -- -- -- --   12/25/20 1001 (!) 169/106 96.8  F (36  C) Temporal 85 16 98 % 1.93 m (6' 4\") 111.1 kg (245 lb)       Physical Exam  General: Resting on the bed.  Head: No obvious trauma to head.  Ears, Nose, Throat:  External ears " normal.  Nose normal.  No pharyngeal erythema, swelling or exudate.  Midline uvula.    Eyes:  Conjunctivae clear.  Pupils are equal, round, and reactive.   Neck: Normal range of motion.  Neck supple.   CV: Regular rate and rhythm.  No murmurs.      Respiratory: Effort normal and breath sounds normal.  No wheezing or crackles.   Gastrointestinal: Soft.  No distension. There is no tenderness  Neuro: Alert. Moving all extremities appropriately.  Normal speech.    Skin: Skin is warm and dry.  No rash noted.       Emergency Department Course     Imaging:  Chest  XR PORT:  Single portable AP view of the chest was obtained. Cardiomediastinal silhouette is within normal limits. No suspicious focal pulmonary opacities. No significant pleural effusion or pneumothorax.    Reading per radiology     Laboratory:  CBC: WBC 4.7, HGB 17.3,    BMP: Glucose 103(H), o/w WNL (Creatinine: 1.07)    COVID-19 virus Swab PCR: pending    Influenza A/B Antigen: pending   UA: Ketones: 40(A), Blood: small (A), Protein Albumin: 30(A), Bacteria: few(A), Mucous: Present, Hyaline Casts: 7 (H),    Emergency Department Course:  Reviewed:  I reviewed the patient's nursing notes, vitals, past medical records, Care Everywhere.     Assessments:  1140 I performed an assessment and examination of the patient as noted above.     1257 Findings and plan explained to the Patient. Patient discharged home with instructions regarding supportive care, medications, and reasons to return. The importance of close follow-up was reviewed.       Interventions:  1130 Acetaminophen, 1000 mg, PO    Disposition:  The patient was discharged to home.       Impression & Plan   Medical Decision Making:  Cristian Antonio is a 60 year old male was evaluated in the ED for chills and weakness.  Patient has low-grade fever but no other acute vital signs abnormality.  Broad differential was pursued including not limited to pneumonia, pneumothorax, effusion, UTI, influenza,  COVID-19, bacteremia, electrolyte, metabolic or renal dysfunction.  CBC shows no leukocytosis or anemia.  BMP shows no acute electrolyte, metabolic or renal dysfunction.  Influenza and Covid swabs are pending.  UA is largely unremarkable, not suggestive of acute infectious.  Ketones are noted that patient has not been eating and was able to eat in the ER and was feeling better.  Chest x-ray shows no acute pneumonia, pneumothorax or effusion.  The patient was afebrile in the ED. The patient's workup did not reveal a bacterial source for infection. I have encouraged symptomatic management with analgesics, and supportive cares.   At this time the patient is stable for discharge and should follow up with their primary care physician in the outpatient setting. Anticipatory guidance given prior to discharge.  Advised self quarantine until COVID-19 testing has returned.  Return precautions were discussed.  Close follow-up was advised.    Diagnosis:    ICD-10-CM    1. Viral upper respiratory tract infection  J06.9    2. Suspected COVID-19 virus infection  Z20.828        Discharge Medications:  There are no discharge medications for this patient.      Scribe Disclosure:  Janene QUINONES, am serving as a scribe at 11:27 AM on 12/25/2020 to document services personally performed by Katie Maurice MD based on my observations and the provider's statements to me.              Katie Maurice MD  12/25/20 [id number]

## 2020-12-26 ENCOUNTER — TELEPHONE (OUTPATIENT)
Dept: EMERGENCY MEDICINE | Facility: CLINIC | Age: 60
End: 2020-12-26

## 2020-12-26 NOTE — TELEPHONE ENCOUNTER
Coronavirus (COVID-19) Notification    Reason for call  Notify of POSITIVE  COVID-19 lab result, assess symptoms,  review [facility] recommendations    Lab Result   Lab test for 2019-nCoV rRt-PCR or SARS-COV-2 PCR  Oropharyngeal AND/OR nasopharyngeal swabs were POSITIVE for 2019-nCoV RNA [OR] SARS-COV-2 RNA (COVID-19) RNA     We have been unable to reach Patient by phone at this time to notify of their Positive COVID-19 result.  Left voicemail message requesting a call back to [phone] [facility] for results.        POSITIVE COVID-19 Letter sent.    Dali Bradley, MSN, RN

## 2020-12-28 ENCOUNTER — PATIENT OUTREACH (OUTPATIENT)
Dept: CARE COORDINATION | Facility: CLINIC | Age: 60
End: 2020-12-28

## 2020-12-28 DIAGNOSIS — U07.1 COVID-19 VIRUS INFECTION: Primary | ICD-10-CM

## 2020-12-28 ASSESSMENT — ACTIVITIES OF DAILY LIVING (ADL): DEPENDENT_IADLS:: INDEPENDENT

## 2020-12-28 NOTE — PROGRESS NOTES
Clinic Care Coordination Contact  [facility]/Voicemail    Referral Source: ED Follow-Up  Clinical Data: Care Coordinator Outreach    Pt tested positive for COVID-19.    Outreach attempted x 1.  Left message on patient's voicemail with call back information and requested return call.    Plan: Care Coordinator will try to reach patient again in 1-2 business days.    Denise Blankenship [facility]  Clinic Care Coordinator  [facility] [place]  [facility]  [phone]  [email or web address]

## 2020-12-29 ENCOUNTER — PATIENT OUTREACH (OUTPATIENT)
Dept: CARE COORDINATION | Facility: CLINIC | Age: 60
End: 2020-12-29

## 2020-12-29 NOTE — PROGRESS NOTES
Clinic Care Coordination Contact  [facility]/Voicemail       Clinical Data: Care Coordinator Outreach    CC SW received voicemail from pt's wife requesting return call.    Outreach attempted x 2.  Left message on pt's wife's voicemail with call back information and requested return call.    Plan: Care Coordinator will try to reach patient again in 1-2 business days.    Denise Blankenship [facility]  Clinic Care Coordinator  [facility] Katie [place]  [facility]  [phone]  [email or web address]

## 2020-12-30 ENCOUNTER — PATIENT OUTREACH (OUTPATIENT)
Dept: NURSING | Facility: CLINIC | Age: 60
End: 2020-12-30
Payer: COMMERCIAL

## 2020-12-30 SDOH — SOCIAL STABILITY: SOCIAL NETWORK: ARE YOU MARRIED, WIDOWED, DIVORCED, SEPARATED, NEVER MARRIED, OR LIVING WITH A PARTNER?: MARRIED

## 2020-12-30 SDOH — SOCIAL STABILITY: SOCIAL NETWORK: HOW OFTEN DO YOU GET TOGETHER WITH FRIENDS OR RELATIVES?: MORE THAN THREE TIMES A WEEK

## 2020-12-30 SDOH — ECONOMIC STABILITY: FOOD INSECURITY: WITHIN THE PAST 12 MONTHS, YOU WORRIED THAT YOUR FOOD WOULD RUN OUT BEFORE YOU GOT MONEY TO BUY MORE.: NEVER TRUE

## 2020-12-30 SDOH — ECONOMIC STABILITY: INCOME INSECURITY: HOW HARD IS IT FOR YOU TO PAY FOR THE VERY BASICS LIKE FOOD, HOUSING, MEDICAL CARE, AND HEATING?: NOT HARD AT ALL

## 2020-12-30 SDOH — ECONOMIC STABILITY: TRANSPORTATION INSECURITY
IN THE PAST 12 MONTHS, HAS LACK OF TRANSPORTATION KEPT YOU FROM MEETINGS, WORK, OR FROM GETTING THINGS NEEDED FOR DAILY LIVING?: NO

## 2020-12-30 SDOH — ECONOMIC STABILITY: FOOD INSECURITY: WITHIN THE PAST 12 MONTHS, THE FOOD YOU BOUGHT JUST DIDN'T LAST AND YOU DIDN'T HAVE MONEY TO GET MORE.: NEVER TRUE

## 2020-12-30 SDOH — ECONOMIC STABILITY: TRANSPORTATION INSECURITY
IN THE PAST 12 MONTHS, HAS THE LACK OF TRANSPORTATION KEPT YOU FROM MEDICAL APPOINTMENTS OR FROM GETTING MEDICATIONS?: NO

## 2020-12-30 ASSESSMENT — ACTIVITIES OF DAILY LIVING (ADL): DEPENDENT_IADLS:: INDEPENDENT

## 2020-12-30 NOTE — PROGRESS NOTES
Clinic Care Coordination Contact    Clinic Care Coordination Contact  OUTREACH    Referral Information:  Referral Source: ED Follow-Up    Primary Diagnosis: Other (include Comment box)(COVID)    Chief Complaint   Patient presents with     Clinic Care Coordination - Initial     Clinic Care Coordination - Post Hospital        Universal Utilization: ED on 12/25 for COVID  Clinic Utilization  No PCP office visit in Past Year: Yes  Utilization    Last refreshed: 12/29/2020 11:02 AM: Hospital Admissions 0           Last refreshed: 12/29/2020 11:02 AM: ED Visits 1           Last refreshed: 12/29/2020 11:02 AM: No Show Count (past year) 1              Current as of: 12/29/2020 11:02 AM            Clinical Concerns:  CC SW spoke with pt's wife using . Marlyn shared that she, pt, and oldest son all tested positive for COVID. Everyone is doing well and recovering. Pt's experienced the most symptoms but he is now doing better. They are currently isolating and she stated that they understand precautions that need to be taken. She had no questions.    Current Medical Concerns:  COVID    Current Behavioral Concerns: none    Education Provided to patient: CC role   Pain  Pain (GOAL):: No  Health Maintenance Reviewed:    Clinical Pathway: None    Medication Management:  None     Functional Status:  Dependent ADLs:: Independent  Dependent IADLs:: Independent  Bed or wheelchair confined:: No  Mobility Status: Independent  Fallen 2 or more times in the past year?: No  Any fall with injury in the past year?: No    Living Situation:  Current living arrangement:: I live in a private home with family  Type of residence:: Private home - stairs    Lifestyle & Psychosocial Needs:     Social Needs     Financial resource strain: Not hard at all     Food insecurity     Worry: Never true     Inability: Never true     Transportation needs     Medical: No     Non-medical: No     Diet:: Regular  Inadequate nutrition (GOAL):: No  Tube  Feeding: No  Inadequate activity/exercise (GOAL):: No  Significant changes in sleep pattern (GOAL): No  Transportation means:: Regular car     Druze or spiritual beliefs that impact treatment:: No  Mental health DX:: No  Mental health management concern (GOAL):: No  Chemical Dependency Status: No Current Concerns  Informal Support system:: Family, Spouse   Socioeconomic History     Marital status:      Spouse name: Not on file     Number of children: Not on file     Years of education: Not on file     Highest education level: Not on file   Relationships     Social connections     Talks on phone: Not on file     Gets together: More than three times a week     Attends Mosque service: Not on file     Active member of club or organization: Not on file     Attends meetings of clubs or organizations: Not on file     Relationship status:      Intimate partner violence     Fear of current or ex partner: Not on file     Emotionally abused: Not on file     Physically abused: Not on file     Forced sexual activity: Not on file     Tobacco Use     Smoking status: Never Smoker     Smokeless tobacco: Never Used   Substance and Sexual Activity     Alcohol use: No     Drug use: No     Sexual activity: Yes     Partners: Female     COVID-19 GetWell Loop:  Testing Results: positive  Email on file OR Smartphone: No  Does the patient speak English: No  Candidate for GetWell Loop: No     Resources and Interventions:  Current Resources:      Community Resources: None  Supplies used at home:: None  Equipment Currently Used at Home: none   )   )    Advance Care Plan/Directive  Advanced Care Plans/Directives on file:: No    Referrals Placed: None     Patient/Caregiver understanding: Pt's wife reports understanding and denies any additional questions or concerns at this times. BRUNO CC engaged in AIDET communication during encounter.    Plan: At this time, pt denies outstanding need for connection or referral to resources or  assistance navigating recommended follow up care. No further outreaches will be made at this time unless a new referral is made or a change in the pt's status occurs. Patient was provided with [facility] contact information and encouraged to call with any questions or concerns.    Denise Blankenship, [facility]  Clinic Care Coordinator  [facility] Katie Schoolcraft  [facility]  [phone]  [email or web address]

## 2020-12-30 NOTE — LETTER
[place] CARE COORDINATION    December 30, 2020    Cristian WARREN Marisela  [address] [place] [address]      Dear Marlyn,    I am a clinic care coordinator who works with Slime Rausch MD at [facility]. I wanted to thank you for spending the time to talk with me.  Below is a description of clinic care coordination and how I can further assist you.      The clinic care coordination team is made up of a registered nurse,  and community health worker who understand the health care system. The goal of clinic care coordination is to help you manage your health and improve access to the health care system in the most efficient manner. The team can assist you in meeting your health care goals by providing education, coordinating services, strengthening the communication among your providers and supporting you with any resource needs.    Please feel free to contact me at [phone] with any questions or concerns. We are focused on providing you with the highest-quality healthcare experience possible and that all starts with you.     Sincerely,     HANNAH Benites

## 2021-04-12 ENCOUNTER — IMMUNIZATION (OUTPATIENT)
Dept: NURSING | Facility: CLINIC | Age: 61
End: 2021-04-12
Payer: COMMERCIAL

## 2021-04-12 PROCEDURE — 91300 PR COVID VAC PFIZER DIL RECON 30 MCG/0.3 ML IM: CPT

## 2021-04-12 PROCEDURE — T1013 SIGN LANG/ORAL INTERPRETER: HCPCS | Mod: U3

## 2021-04-12 PROCEDURE — 0001A PR COVID VAC PFIZER DIL RECON 30 MCG/0.3 ML IM: CPT

## 2021-05-03 ENCOUNTER — IMMUNIZATION (OUTPATIENT)
Dept: NURSING | Facility: CLINIC | Age: 61
End: 2021-05-03
Attending: RADIOLOGY
Payer: COMMERCIAL

## 2021-05-03 PROCEDURE — 91300 PR COVID VAC PFIZER DIL RECON 30 MCG/0.3 ML IM: CPT

## 2021-05-03 PROCEDURE — 0002A PR COVID VAC PFIZER DIL RECON 30 MCG/0.3 ML IM: CPT

## 2021-07-06 ENCOUNTER — HOSPITAL ENCOUNTER (EMERGENCY)
Facility: CLINIC | Age: 61
Discharge: HOME | End: 2021-07-06
Attending: EMERGENCY MEDICINE | Admitting: EMERGENCY MEDICINE
Payer: COMMERCIAL

## 2021-07-06 VITALS
HEART RATE: 94 BPM | BODY MASS INDEX: 29.83 KG/M2 | SYSTOLIC BLOOD PRESSURE: 167 MMHG | RESPIRATION RATE: 18 BRPM | OXYGEN SATURATION: 99 % | WEIGHT: 245 LBS | TEMPERATURE: 96.7 F | HEIGHT: 76 IN | DIASTOLIC BLOOD PRESSURE: 130 MMHG

## 2021-07-06 DIAGNOSIS — T18.128A ESOPHAGEAL OBSTRUCTION DUE TO FOOD IMPACTION: ICD-10-CM
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
## 2021-07-06 LAB
ALBUMIN SERPL-MCNC: 4 G/DL (ref 3.4–5)
ALP SERPL-CCNC: 96 U/L (ref 40–150)
ALT SERPL W P-5'-P-CCNC: 31 U/L (ref 0–70)
ANION GAP SERPL CALCULATED.3IONS-SCNC: 9 MMOL/L (ref 3–14)
AST SERPL W P-5'-P-CCNC: 21 U/L (ref 0–45)
BASOPHILS # BLD AUTO: 0 10E9/L (ref 0–0.2)
BASOPHILS NFR BLD AUTO: 0.2 %
BILIRUB SERPL-MCNC: 2.3 MG/DL (ref 0.2–1.3)
BUN SERPL-MCNC: 25 MG/DL (ref 7–30)
CALCIUM SERPL-MCNC: 9.9 MG/DL (ref 8.5–10.1)
CHLORIDE SERPL-SCNC: 116 MMOL/L (ref 94–109)
CO2 SERPL-SCNC: 19 MMOL/L (ref 20–32)
CREAT SERPL-MCNC: 1.28 MG/DL (ref 0.66–1.25)
DIFFERENTIAL METHOD BLD: ABNORMAL
EOSINOPHIL # BLD AUTO: 0 10E9/L (ref 0–0.7)
EOSINOPHIL NFR BLD AUTO: 0.2 %
ERYTHROCYTE [DISTWIDTH] IN BLOOD BY AUTOMATED COUNT: 12.4 % (ref 10–15)
GFR SERPL CREATININE-BSD FRML MDRD: 60 ML/MIN/{1.73_M2}
GLUCOSE SERPL-MCNC: 115 MG/DL (ref 70–99)
HCT VFR BLD AUTO: 51 % (ref 40–53)
HGB BLD-MCNC: 17.9 G/DL (ref 13.3–17.7)
IMM GRANULOCYTES # BLD: 0 10E9/L (ref 0–0.4)
IMM GRANULOCYTES NFR BLD: 0.3 %
LIPASE SERPL-CCNC: 78 U/L (ref 73–393)
LYMPHOCYTES # BLD AUTO: 1.3 10E9/L (ref 0.8–5.3)
LYMPHOCYTES NFR BLD AUTO: 9.6 %
MCH RBC QN AUTO: 29 PG (ref 26.5–33)
MCHC RBC AUTO-ENTMCNC: 35.1 G/DL (ref 31.5–36.5)
MCV RBC AUTO: 83 FL (ref 78–100)
MONOCYTES # BLD AUTO: 1.1 10E9/L (ref 0–1.3)
MONOCYTES NFR BLD AUTO: 8.2 %
NEUTROPHILS # BLD AUTO: 10.6 10E9/L (ref 1.6–8.3)
NEUTROPHILS NFR BLD AUTO: 81.5 %
NRBC # BLD AUTO: 0 10*3/UL
NRBC BLD AUTO-RTO: 0 /100
PLATELET # BLD AUTO: 233 10E9/L (ref 150–450)
POTASSIUM SERPL-SCNC: 3.6 MMOL/L (ref 3.4–5.3)
PROT SERPL-MCNC: 8.5 G/DL (ref 6.8–8.8)
RBC # BLD AUTO: 6.17 10E12/L (ref 4.4–5.9)
SODIUM SERPL-SCNC: 144 MMOL/L (ref 133–144)
WBC # BLD AUTO: 13 10E9/L (ref 4–11)

## 2021-07-06 PROCEDURE — 99284 EMERGENCY DEPT VISIT MOD MDM: CPT | Mod: 25

## 2021-07-06 PROCEDURE — 96361 HYDRATE IV INFUSION ADD-ON: CPT

## 2021-07-06 PROCEDURE — 258N000003 HC RX IP 258 OP 636: Performed by: EMERGENCY MEDICINE

## 2021-07-06 PROCEDURE — 80053 COMPREHEN METABOLIC PANEL: CPT | Performed by: EMERGENCY MEDICINE

## 2021-07-06 PROCEDURE — 96374 THER/PROPH/DIAG INJ IV PUSH: CPT

## 2021-07-06 PROCEDURE — 250N000011 HC RX IP 250 OP 636: Performed by: EMERGENCY MEDICINE

## 2021-07-06 PROCEDURE — 83690 ASSAY OF LIPASE: CPT | Performed by: EMERGENCY MEDICINE

## 2021-07-06 PROCEDURE — 255N000001 HC RX 255: Performed by: EMERGENCY MEDICINE

## 2021-07-06 PROCEDURE — 85025 COMPLETE CBC W/AUTO DIFF WBC: CPT | Performed by: EMERGENCY MEDICINE

## 2021-07-06 RX ORDER — SODIUM CHLORIDE 9 MG/ML
INJECTION, SOLUTION INTRAVENOUS CONTINUOUS
Status: DISCONTINUED | OUTPATIENT
Start: 2021-07-06 | End: 2021-07-07 | Stop reason: HOSPADM

## 2021-07-06 RX ADMIN — SODIUM CHLORIDE: 9 INJECTION, SOLUTION INTRAVENOUS at 21:19

## 2021-07-06 RX ADMIN — GLUCAGON HYDROCHLORIDE 1 MG: KIT at 21:39

## 2021-07-06 RX ADMIN — SODIUM CHLORIDE 1000 ML: 9 INJECTION, SOLUTION INTRAVENOUS at 22:17

## 2021-07-06 RX ADMIN — ANTACID/ANTIFLATULENT 4 G: 380; 550; 10; 10 GRANULE, EFFERVESCENT ORAL at 21:52

## 2021-07-06 ASSESSMENT — MIFFLIN-ST. JEOR: SCORE: 2012.81

## 2021-07-06 ASSESSMENT — ENCOUNTER SYMPTOMS
NAUSEA: 1
VOMITING: 1
ABDOMINAL PAIN: 0

## 2021-07-07 NOTE — ED NOTES
Pt reports been vomiting since yesterday at noon. Pt is unable to keep anything down. Pt needs an american sign language interpretor. Wife can be reached at [phone].

## 2021-07-07 NOTE — ED NOTES
Pt given EZgas medication as ordered.  Patient reports that he feels slightly better after drinking small sips of water. No vomiting since EZgas admin.

## 2021-07-07 NOTE — ED PROVIDER NOTES
"  History   Chief Complaint:  Nausea & Vomiting     The history is provided by the patient. A  was used (American Sign Language).      Cristian Antonio is a 61 year old deaf male who presents with nausea and vomiting. He reports of vomiting and nausea beginning yesterday afternoon after he felt some chicken from his meal become stuck in his throat. He denies diarrhea or difficulty passing stools. He has not been able to swallow fluids and regurgitates it as soon as it enters his throat. He had a similar episode two years ago. He denies abdominal pain. He denies a history of bowel obstruction or abdominal surgeries. He denies any current medical problems and denies any current medications.     Review of Systems   Gastrointestinal: Positive for nausea and vomiting. Negative for abdominal pain.   All other systems reviewed and are negative.    Allergies:  Banana  Ibuprofen    Medications:  The patient is not currently taking any prescribed medications.    Past Medical History:    Hypertension   Deaf     Past Surgical History:    Denies a history of abdominal surgeries.     Social History:  Arrives unaccompanied to the emergency department.     Physical Exam     Patient Vitals for the past 24 hrs:   BP Temp Temp src Pulse Resp SpO2 Height Weight   07/06/21 1928 (!) 184/124 96.7  F (35.9  C) Temporal 81 18 100 % 1.93 m (6' 4\") 111.1 kg (245 lb)       Physical Exam  Eyes:  The pupils are equal and round    Conjunctivae and sclerae are normal  ENT:    The nose is normal    Pinnae are normal    The oropharynx is normal  CV:  Regular rate and rhythm     No edema  Resp:  Lungs are clear    Non-labored    No rales    No wheezing   GI:  Abdomen is soft and non-tender, there is no rigidity    No distension    No rebound tenderness   MS:  Normal muscular tone    No asymmetric leg swelling  Skin:  No rash or acute skin lesions noted  Neuro:   Awake, alert.      Speech is normal and fluent.    Face is symmetric. "     Moves all extremities    Emergency Department Course     Laboratory:   CBC: WBC 13.0 (H), HGB 17.9 (H),   CMP: Glucose 115 (H), Creatinine 1.28 (H), GFR 60 (L), Chloride 116 (H), CO2 19 (L), Bilirubin Total 2.3 (H), o/w WNL    Lipase: 78    Emergency Department Course:    Reviewed:  I reviewed nursing notes, vitals, past medical history and care everywhere    Assessments:  2117 I obtained history and examined the patient as noted above.   2206 I rechecked the patient and explained findings. They feel improved after medications.     Interventions:  2139 Glucagon 1 mg IV   2152 EZ Gas 4 g PO  2217 0.9% NaCl bolus 1,000 mL IV    Disposition:  The patient was discharged to home.     Impression & Plan     Medical Decision Making:  Cristian Antonio is a 61 year old male who presents to the emergency department with difficulty swallowing.  This started yesterday after eating a piece of chicken.  He did have a problem similar to this in the past and had an upper endoscopy performed and was started on omeprazole as an outpatient.  Here patient reports that is not able to drink any liquids at home.  He was given glucagon and easy gas.  He notes that his symptoms resolved after using the EZ gas and he is able to swallow liquids clearly here.  He is given a prescription for omeprazole.  He is encouraged to follow-up with gastroenterology to have upper endoscopy performed again.  Encourage return to the emergency department for any new or worrisome symptoms.  He was given IV fluids for dehydration.      Diagnosis:    ICD-10-CM    1. Esophageal obstruction due to food impaction  K22.2     T18.128A        Discharge Medications:  New Prescriptions    OMEPRAZOLE (PRILOSEC) 20 MG DR CAPSULE    Take 1 capsule (20 mg) by mouth daily       Scribe Disclosure:  Cade QUINONES, am serving as a scribe at 9:14 PM on 7/6/2021 to document services personally performed by Buck Ragland MD based on my observations and  the provider's statements to me.              Buck Ragland MD  07/06/21 1329

## 2022-01-10 ENCOUNTER — IMMUNIZATION (OUTPATIENT)
Dept: NURSING | Facility: CLINIC | Age: 62
End: 2022-01-10
Payer: COMMERCIAL

## 2022-01-10 PROCEDURE — 91305 COVID-19,PF,PFIZER (12+ YRS): CPT

## 2022-01-10 PROCEDURE — 0054A COVID-19,PF,PFIZER (12+ YRS): CPT

## 2022-01-10 PROCEDURE — 90471 IMMUNIZATION ADMIN: CPT

## 2022-01-10 PROCEDURE — 90682 RIV4 VACC RECOMBINANT DNA IM: CPT

## 2023-04-16 ENCOUNTER — HOSPITAL ENCOUNTER (INPATIENT)
Facility: CLINIC | Age: 63
LOS: 6 days | Discharge: HOME | DRG: 418 | End: 2023-04-22
Attending: EMERGENCY MEDICINE | Admitting: INTERNAL MEDICINE
Payer: COMMERCIAL

## 2023-04-16 ENCOUNTER — APPOINTMENT (OUTPATIENT)
Dept: GENERAL RADIOLOGY | Facility: CLINIC | Age: 63
DRG: 418 | End: 2023-04-16
Attending: EMERGENCY MEDICINE
Payer: COMMERCIAL

## 2023-04-16 DIAGNOSIS — R11.10 VOMITING AND DIARRHEA: ICD-10-CM

## 2023-04-16 DIAGNOSIS — I10 HYPERTENSION, UNSPECIFIED TYPE: ICD-10-CM

## 2023-04-16 DIAGNOSIS — N17.9 AKI (ACUTE KIDNEY INJURY) (H): ICD-10-CM

## 2023-04-16 DIAGNOSIS — R19.7 VOMITING AND DIARRHEA: ICD-10-CM

## 2023-04-16 DIAGNOSIS — B99.9 INTRA-ABDOMINAL INFECTION: ICD-10-CM

## 2023-04-16 DIAGNOSIS — K81.0 ACUTE CHOLECYSTITIS: Primary | ICD-10-CM

## 2023-04-16 LAB
ALBUMIN SERPL BCG-MCNC: 3.4 G/DL (ref 3.5–5.2)
ALP SERPL-CCNC: 142 U/L (ref 40–129)
ALT SERPL W P-5'-P-CCNC: 56 U/L (ref 10–50)
ANION GAP SERPL CALCULATED.3IONS-SCNC: 18 MMOL/L (ref 7–15)
AST SERPL W P-5'-P-CCNC: 49 U/L (ref 10–50)
BASOPHILS # BLD AUTO: 0 10E3/UL (ref 0–0.2)
BASOPHILS NFR BLD AUTO: 0 %
BILIRUB SERPL-MCNC: 1.4 MG/DL
BUN SERPL-MCNC: 81.4 MG/DL (ref 8–23)
CALCIUM SERPL-MCNC: 9 MG/DL (ref 8.8–10.2)
CHLORIDE SERPL-SCNC: 93 MMOL/L (ref 98–107)
CREAT SERPL-MCNC: 3.45 MG/DL (ref 0.67–1.17)
DEPRECATED HCO3 PLAS-SCNC: 20 MMOL/L (ref 22–29)
EOSINOPHIL # BLD AUTO: 0 10E3/UL (ref 0–0.7)
EOSINOPHIL NFR BLD AUTO: 1 %
ERYTHROCYTE [DISTWIDTH] IN BLOOD BY AUTOMATED COUNT: 13.1 % (ref 10–15)
FLUAV RNA SPEC QL NAA+PROBE: NEGATIVE
FLUBV RNA RESP QL NAA+PROBE: NEGATIVE
GFR SERPL CREATININE-BSD FRML MDRD: 19 ML/MIN/1.73M2
GLUCOSE SERPL-MCNC: 107 MG/DL (ref 70–99)
HCT VFR BLD AUTO: 48.9 % (ref 40–53)
HGB BLD-MCNC: 17.7 G/DL (ref 13.3–17.7)
HOLD SPECIMEN: NORMAL
IMM GRANULOCYTES # BLD: 0.1 10E3/UL
IMM GRANULOCYTES NFR BLD: 1 %
LIPASE SERPL-CCNC: 13 U/L (ref 13–60)
LYMPHOCYTES # BLD AUTO: 0.3 10E3/UL (ref 0.8–5.3)
LYMPHOCYTES NFR BLD AUTO: 4 %
MCH RBC QN AUTO: 29.4 PG (ref 26.5–33)
MCHC RBC AUTO-ENTMCNC: 36.2 G/DL (ref 31.5–36.5)
MCV RBC AUTO: 81 FL (ref 78–100)
MONOCYTES # BLD AUTO: 0.1 10E3/UL (ref 0–1.3)
MONOCYTES NFR BLD AUTO: 1 %
NEUTROPHILS # BLD AUTO: 6.7 10E3/UL (ref 1.6–8.3)
NEUTROPHILS NFR BLD AUTO: 93 %
NRBC # BLD AUTO: 0 10E3/UL
NRBC BLD AUTO-RTO: 0 /100
PLATELET # BLD AUTO: 85 10E3/UL (ref 150–450)
POTASSIUM SERPL-SCNC: 3.7 MMOL/L (ref 3.4–5.3)
PROT SERPL-MCNC: 7.3 G/DL (ref 6.4–8.3)
RBC # BLD AUTO: 6.02 10E6/UL (ref 4.4–5.9)
RSV RNA SPEC NAA+PROBE: NEGATIVE
SARS-COV-2 RNA RESP QL NAA+PROBE: NEGATIVE
SODIUM SERPL-SCNC: 131 MMOL/L (ref 136–145)
WBC # BLD AUTO: 7.2 10E3/UL (ref 4–11)

## 2023-04-16 PROCEDURE — 258N000003 HC RX IP 258 OP 636: Performed by: EMERGENCY MEDICINE

## 2023-04-16 PROCEDURE — 96361 HYDRATE IV INFUSION ADD-ON: CPT

## 2023-04-16 PROCEDURE — 87637 SARSCOV2&INF A&B&RSV AMP PRB: CPT | Performed by: EMERGENCY MEDICINE

## 2023-04-16 PROCEDURE — C9803 HOPD COVID-19 SPEC COLLECT: HCPCS

## 2023-04-16 PROCEDURE — 71046 X-RAY EXAM CHEST 2 VIEWS: CPT

## 2023-04-16 PROCEDURE — 83690 ASSAY OF LIPASE: CPT | Performed by: EMERGENCY MEDICINE

## 2023-04-16 PROCEDURE — 85025 COMPLETE CBC W/AUTO DIFF WBC: CPT | Performed by: EMERGENCY MEDICINE

## 2023-04-16 PROCEDURE — 250N000011 HC RX IP 250 OP 636: Performed by: INTERNAL MEDICINE

## 2023-04-16 PROCEDURE — 99221 1ST HOSP IP/OBS SF/LOW 40: CPT | Mod: AI | Performed by: INTERNAL MEDICINE

## 2023-04-16 PROCEDURE — 250N000011 HC RX IP 250 OP 636: Performed by: EMERGENCY MEDICINE

## 2023-04-16 PROCEDURE — 96374 THER/PROPH/DIAG INJ IV PUSH: CPT

## 2023-04-16 PROCEDURE — 99285 EMERGENCY DEPT VISIT HI MDM: CPT | Mod: CS,25

## 2023-04-16 PROCEDURE — 120N000001 HC R&B MED SURG/OB

## 2023-04-16 PROCEDURE — 80053 COMPREHEN METABOLIC PANEL: CPT | Performed by: EMERGENCY MEDICINE

## 2023-04-16 PROCEDURE — 36415 COLL VENOUS BLD VENIPUNCTURE: CPT | Performed by: EMERGENCY MEDICINE

## 2023-04-16 RX ORDER — AMOXICILLIN 250 MG
1 CAPSULE ORAL 2 TIMES DAILY PRN
Status: DISCONTINUED | OUTPATIENT
Start: 2023-04-16 | End: 2023-04-22 | Stop reason: HOSPADM

## 2023-04-16 RX ORDER — AMOXICILLIN 250 MG
2 CAPSULE ORAL 2 TIMES DAILY PRN
Status: DISCONTINUED | OUTPATIENT
Start: 2023-04-16 | End: 2023-04-22 | Stop reason: HOSPADM

## 2023-04-16 RX ORDER — SODIUM CHLORIDE AND POTASSIUM CHLORIDE 150; 900 MG/100ML; MG/100ML
INJECTION, SOLUTION INTRAVENOUS CONTINUOUS
Status: DISCONTINUED | OUTPATIENT
Start: 2023-04-16 | End: 2023-04-20

## 2023-04-16 RX ORDER — ACETAMINOPHEN 650 MG/1
650 SUPPOSITORY RECTAL EVERY 6 HOURS PRN
Status: DISCONTINUED | OUTPATIENT
Start: 2023-04-16 | End: 2023-04-21

## 2023-04-16 RX ORDER — ONDANSETRON 2 MG/ML
4 INJECTION INTRAMUSCULAR; INTRAVENOUS EVERY 6 HOURS PRN
Status: DISCONTINUED | OUTPATIENT
Start: 2023-04-16 | End: 2023-04-22 | Stop reason: HOSPADM

## 2023-04-16 RX ORDER — LIDOCAINE 40 MG/G
CREAM TOPICAL
Status: DISCONTINUED | OUTPATIENT
Start: 2023-04-16 | End: 2023-04-22 | Stop reason: HOSPADM

## 2023-04-16 RX ORDER — SENNOSIDES 8.6 MG
650 CAPSULE ORAL EVERY 8 HOURS PRN
COMMUNITY

## 2023-04-16 RX ORDER — ACETAMINOPHEN 325 MG/1
650 TABLET ORAL EVERY 6 HOURS PRN
Status: DISCONTINUED | OUTPATIENT
Start: 2023-04-16 | End: 2023-04-21

## 2023-04-16 RX ORDER — ONDANSETRON 4 MG/1
4 TABLET, ORALLY DISINTEGRATING ORAL EVERY 6 HOURS PRN
Status: DISCONTINUED | OUTPATIENT
Start: 2023-04-16 | End: 2023-04-22 | Stop reason: HOSPADM

## 2023-04-16 RX ORDER — ONDANSETRON 2 MG/ML
4 INJECTION INTRAMUSCULAR; INTRAVENOUS ONCE
Status: COMPLETED | OUTPATIENT
Start: 2023-04-16 | End: 2023-04-16

## 2023-04-16 RX ORDER — POLYETHYLENE GLYCOL 3350 17 G/17G
17 POWDER, FOR SOLUTION ORAL DAILY PRN
Status: DISCONTINUED | OUTPATIENT
Start: 2023-04-16 | End: 2023-04-22 | Stop reason: HOSPADM

## 2023-04-16 RX ADMIN — ONDANSETRON 4 MG: 2 INJECTION INTRAMUSCULAR; INTRAVENOUS at 22:15

## 2023-04-16 RX ADMIN — SODIUM CHLORIDE AND POTASSIUM CHLORIDE: 9; 1.49 INJECTION, SOLUTION INTRAVENOUS at 23:27

## 2023-04-16 RX ADMIN — SODIUM CHLORIDE 1000 ML: 9 INJECTION, SOLUTION INTRAVENOUS at 17:34

## 2023-04-16 RX ADMIN — SODIUM CHLORIDE 1000 ML: 9 INJECTION, SOLUTION INTRAVENOUS at 22:15

## 2023-04-16 RX ADMIN — ONDANSETRON 4 MG: 2 INJECTION INTRAMUSCULAR; INTRAVENOUS at 17:37

## 2023-04-16 ASSESSMENT — ENCOUNTER SYMPTOMS
DIARRHEA: 1
BLOOD IN STOOL: 0
COUGH: 0
VOMITING: 1
CHILLS: 1
FEVER: 0
NAUSEA: 1
ABDOMINAL PAIN: 1

## 2023-04-16 ASSESSMENT — ACTIVITIES OF DAILY LIVING (ADL)
ADLS_ACUITY_SCORE: 35

## 2023-04-16 NOTE — LETTER
[facility]  [address] [facility] [address]  Phone: [phone]  Fax: [phone]    April 20, 2023        Cristian Antonio  [address] [place] [address]          To whom it may concern:    RE: Cristian Antonio required treatment for an urgent abdominal ailment that began last week on 4/13/23. He ultimately was hospitalized and underwent abdominal surgery on 4/19/23. He will need time off work to recover.     Please contact me for questions or concerns.      Sincerely,        Kwesi Blankenship PA-C  [phone]

## 2023-04-16 NOTE — ED PROVIDER NOTES
"  History     Chief Complaint:  Abdominal Pain, Nausea, Vomiting, and Diarrhea       HPI The history is obtained through the American Sign Language interpretation service.     Cristian Antonio is a 62 year old male who presents for evaluation of abdominal pain, nausea, vomiting, or diarrhea. On the evening of 4/12/23 the patient started to develop cramping abdominal pain, nausea, and vomiting after eating a large amount of food from [place]. The next day he additionally developed diarrhea. Since then he has continued to have vomiting and diarrhea. Today he reports that he had a 20 minute episode where he felt very chilled and had involuntary full body shaking. He took a warm bath and had some improvement of this. However, due to concern for this episode and his ongoing symptoms he came into the ED for evaluation. He has not had any measured fever, cough, or bloody stools.      Independent Historian:   None - Patient Only    Review of External Notes: N/A    ROS:  Review of Systems   Constitutional: Positive for chills. Negative for fever.   Respiratory: Negative for cough.    Gastrointestinal: Positive for abdominal pain, diarrhea, nausea and vomiting. Negative for blood in stool.   All other systems reviewed and are negative.      Allergies:  Ibuprofen     Medications:    The patient is not currently taking any prescribed medications.     Past Medical History:    Deafness   Hypertension      Social History:  The patient presents to the ED alone.   Marital status:         Physical Exam     Patient Vitals for the past 24 hrs:   BP Temp Temp src Pulse Resp SpO2 Height Weight   04/16/23 2301 125/88 98.4  F (36.9  C) Oral 77 18 97 % 1.956 m (6' 5\") 109.5 kg (241 lb 6.4 oz)   04/16/23 1900 (!) 133/92 -- -- -- -- -- -- --   04/16/23 1713 119/79 97.2  F (36.2  C) Oral (!) 124 24 97 % -- --        Physical Exam  General: Alert and cooperative with exam. Patient in mild distress. Normal mentation. Speaks using ASL " .   Head:  Scalp is NC/AT  Eyes:  No scleral icterus, PERRL  ENT:  The external nose and ears are normal. The oropharynx is normal and without erythema; mucus membranes are dry. Poor dentition. Uvula midline, no evidence of deep space infection.  Neck:  Normal range of motion without rigidity.  CV:  Regular rate and rhythm    No pathologic murmur   Resp:  Breath sounds are clear bilaterally    Non-labored, no retractions or accessory muscle use  GI:  Abdomen is soft, no distension, no tenderness. No peritoneal signs  MS:  No lower extremity edema   Skin:  Warm and dry, No rash or lesions noted.  Neuro: Oriented x 3. No gross motor deficits.       Emergency Department Course     Imaging:  Chest XR,  PA & LAT   Final Result   IMPRESSION: No pleural fluid or pneumothorax. No airspace disease or edema. Normal size of the heart.      Report per radiology    Laboratory:  Labs Ordered and Resulted from Time of ED Arrival to Time of ED Departure   COMPREHENSIVE METABOLIC PANEL - Abnormal       Result Value    Sodium 131 (*)     Potassium 3.7      Chloride 93 (*)     Carbon Dioxide (CO2) 20 (*)     Anion Gap 18 (*)     Urea Nitrogen 81.4 (*)     Creatinine 3.45 (*)     Calcium 9.0      Glucose 107 (*)     Alkaline Phosphatase 142 (*)     AST 49      ALT 56 (*)     Protein Total 7.3      Albumin 3.4 (*)     Bilirubin Total 1.4 (*)     GFR Estimate 19 (*)    CBC WITH PLATELETS AND DIFFERENTIAL - Abnormal    WBC Count 7.2      RBC Count 6.02 (*)     Hemoglobin 17.7      Hematocrit 48.9      MCV 81      MCH 29.4      MCHC 36.2      RDW 13.1      Platelet Count 85 (*)     % Neutrophils 93      % Lymphocytes 4      % Monocytes 1      % Eosinophils 1      % Basophils 0      % Immature Granulocytes 1      NRBCs per 100 WBC 0      Absolute Neutrophils 6.7      Absolute Lymphocytes 0.3 (*)     Absolute Monocytes 0.1      Absolute Eosinophils 0.0      Absolute Basophils 0.0      Absolute Immature Granulocytes 0.1       Absolute NRBCs 0.0     LIPASE - Normal    Lipase 13     INFLUENZA A/B, RSV, & SARS-COV2 PCR - Normal    Influenza A PCR Negative      Influenza B PCR Negative      RSV PCR Negative      SARS CoV2 PCR Negative     ROUTINE UA WITH MICROSCOPIC   ENTERIC BACTERIA AND VIRUS PANEL BY COREEN STOOL       Emergency Department Course & Assessments:     Interventions:  1734 NS 1,000 mL IV   1737 Zofran 4 mg IV     Assessments:  1754: The patient was seen and evaluated.     Independent Interpretation (X-rays, CTs, rhythm strip):  I reviewed the patient's chest x-ray; no evidence of acute infiltrate, effusion, or pneumothorax.    Consultations/Discussion of Management or Tests:  2119: I spoke with Dr. Perkins of the hospitalist service regarding patient's presentation, findings, and plan of care.         Social Determinants of Health affecting care:   None    Disposition:  The patient was admitted to the hospital under the care of Dr. Perkins.     Impression & Plan      Medical Decision Making:  Cristian Antonio is a 62 year old male who presents for evaluation of nausea, vomiting and diarrhea with mild abdominal pain in a nonfocal abdominal exam. I considered a broad differential diagnosis for this patient including viral gastroenteritis, bacterial infection of the large intestine (salmonella, shigella, campylobacter, e coli, etc), bowel obstruction, intra-abdominal infection such as colitis, food poisoning, cholecystitis, UTI, pyelonephritis, appendicitis, etc.  There are no signs of worrisome intra-abdominal pathologies detected during the visit today.  He has a benign abdominal exam without rebound, guarding, or marked tenderness to palpation.  Labs were obtained and notable for significant acute kidney injury (creatinine 3.45; likely prerenal component given profound reported vomiting and diarrhea).  Given concern for potential foodborne illness, stool studies were ordered.  Labs also notable for elevated anion gap (18; likely  starvation ketosis from poor oral intake).  Patient was provided IV fluids as well as repeated doses of Zofran for nausea.  Given ongoing symptoms patient will be admitted to the hospital service for further evaluation and care.     Diagnosis:    ICD-10-CM    1. Vomiting and diarrhea  R11.10     R19.7       2. SANDHYA (acute kidney injury) (H)  N17.9            Scribe Disclosure:  I, Deondre Villatoro, am serving as a scribe at 5:54 PM on 4/16/2023 to document services personally performed by Zaid Ferrell DO based on my observations and the provider's statements to me.   4/16/2023   Zaid Ferrell Christopher Warren, DO  04/17/23 0204

## 2023-04-16 NOTE — ED TRIAGE NOTES
Pt states having abd pain and n/v/d     Triage Assessment     Row Name 04/16/23 3882       Triage Assessment (Adult)    Airway WDL WDL       Respiratory WDL    Respiratory WDL WDL       Cardiac WDL    Cardiac WDL WDL       Cognitive/Neuro/Behavioral WDL    Cognitive/Neuro/Behavioral WDL WDL

## 2023-04-17 ENCOUNTER — OFFICE VISIT (OUTPATIENT)
Dept: INTERPRETER SERVICES | Facility: CLINIC | Age: 63
End: 2023-04-17
Payer: COMMERCIAL

## 2023-04-17 LAB
ALBUMIN SERPL BCG-MCNC: 2.9 G/DL (ref 3.5–5.2)
ALBUMIN UR-MCNC: 30 MG/DL
ALP SERPL-CCNC: 150 U/L (ref 40–129)
ALT SERPL W P-5'-P-CCNC: 50 U/L (ref 10–50)
AMORPH CRY #/AREA URNS HPF: ABNORMAL /HPF
ANION GAP SERPL CALCULATED.3IONS-SCNC: 12 MMOL/L (ref 7–15)
APPEARANCE UR: CLEAR
AST SERPL W P-5'-P-CCNC: 35 U/L (ref 10–50)
BILIRUB SERPL-MCNC: 1.2 MG/DL
BILIRUB UR QL STRIP: NEGATIVE
BUN SERPL-MCNC: 68.3 MG/DL (ref 8–23)
CALCIUM SERPL-MCNC: 8.5 MG/DL (ref 8.8–10.2)
CHLORIDE SERPL-SCNC: 103 MMOL/L (ref 98–107)
COLOR UR AUTO: YELLOW
CREAT SERPL-MCNC: 2.64 MG/DL (ref 0.67–1.17)
DEPRECATED HCO3 PLAS-SCNC: 20 MMOL/L (ref 22–29)
ERYTHROCYTE [DISTWIDTH] IN BLOOD BY AUTOMATED COUNT: 13.2 % (ref 10–15)
GFR SERPL CREATININE-BSD FRML MDRD: 26 ML/MIN/1.73M2
GLUCOSE SERPL-MCNC: 147 MG/DL (ref 70–99)
GLUCOSE UR STRIP-MCNC: NEGATIVE MG/DL
HCT VFR BLD AUTO: 43.5 % (ref 40–53)
HGB BLD-MCNC: 15.4 G/DL (ref 13.3–17.7)
HGB UR QL STRIP: ABNORMAL
HYALINE CASTS: 5 /LPF
KETONES UR STRIP-MCNC: NEGATIVE MG/DL
LEUKOCYTE ESTERASE UR QL STRIP: NEGATIVE
MCH RBC QN AUTO: 29 PG (ref 26.5–33)
MCHC RBC AUTO-ENTMCNC: 35.4 G/DL (ref 31.5–36.5)
MCV RBC AUTO: 82 FL (ref 78–100)
MUCOUS THREADS #/AREA URNS LPF: PRESENT /LPF
NITRATE UR QL: NEGATIVE
PH UR STRIP: 5 [PH] (ref 5–7)
PLATELET # BLD AUTO: 71 10E3/UL (ref 150–450)
POTASSIUM SERPL-SCNC: 3.7 MMOL/L (ref 3.4–5.3)
PROCALCITONIN SERPL IA-MCNC: 33.93 NG/ML
PROT SERPL-MCNC: 6.2 G/DL (ref 6.4–8.3)
RBC # BLD AUTO: 5.31 10E6/UL (ref 4.4–5.9)
RBC URINE: 2 /HPF
SODIUM SERPL-SCNC: 135 MMOL/L (ref 136–145)
SP GR UR STRIP: 1.02 (ref 1–1.03)
UROBILINOGEN UR STRIP-MCNC: 2 MG/DL
WBC # BLD AUTO: 14.5 10E3/UL (ref 4–11)
WBC URINE: 3 /HPF

## 2023-04-17 PROCEDURE — 87506 IADNA-DNA/RNA PROBE TQ 6-11: CPT | Performed by: INTERNAL MEDICINE

## 2023-04-17 PROCEDURE — 120N000001 HC R&B MED SURG/OB

## 2023-04-17 PROCEDURE — 84145 PROCALCITONIN (PCT): CPT | Performed by: HOSPITALIST

## 2023-04-17 PROCEDURE — 99233 SBSQ HOSP IP/OBS HIGH 50: CPT | Performed by: HOSPITALIST

## 2023-04-17 PROCEDURE — 250N000011 HC RX IP 250 OP 636: Performed by: INTERNAL MEDICINE

## 2023-04-17 PROCEDURE — 250N000013 HC RX MED GY IP 250 OP 250 PS 637: Performed by: INTERNAL MEDICINE

## 2023-04-17 PROCEDURE — T1013 SIGN LANG/ORAL INTERPRETER: HCPCS | Mod: U3

## 2023-04-17 PROCEDURE — 87040 BLOOD CULTURE FOR BACTERIA: CPT | Performed by: HOSPITALIST

## 2023-04-17 PROCEDURE — 36415 COLL VENOUS BLD VENIPUNCTURE: CPT | Performed by: HOSPITALIST

## 2023-04-17 PROCEDURE — 36415 COLL VENOUS BLD VENIPUNCTURE: CPT | Performed by: INTERNAL MEDICINE

## 2023-04-17 PROCEDURE — 80053 COMPREHEN METABOLIC PANEL: CPT | Performed by: INTERNAL MEDICINE

## 2023-04-17 PROCEDURE — 81001 URINALYSIS AUTO W/SCOPE: CPT | Performed by: INTERNAL MEDICINE

## 2023-04-17 PROCEDURE — 250N000011 HC RX IP 250 OP 636: Performed by: HOSPITALIST

## 2023-04-17 PROCEDURE — 85027 COMPLETE CBC AUTOMATED: CPT | Performed by: INTERNAL MEDICINE

## 2023-04-17 RX ORDER — PIPERACILLIN SODIUM, TAZOBACTAM SODIUM 3; .375 G/15ML; G/15ML
3.38 INJECTION, POWDER, LYOPHILIZED, FOR SOLUTION INTRAVENOUS EVERY 6 HOURS
Status: DISCONTINUED | OUTPATIENT
Start: 2023-04-17 | End: 2023-04-22

## 2023-04-17 RX ADMIN — PIPERACILLIN AND TAZOBACTAM 3.38 G: 3; .375 INJECTION, POWDER, FOR SOLUTION INTRAVENOUS at 17:51

## 2023-04-17 RX ADMIN — PIPERACILLIN AND TAZOBACTAM 3.38 G: 3; .375 INJECTION, POWDER, FOR SOLUTION INTRAVENOUS at 12:21

## 2023-04-17 RX ADMIN — ACETAMINOPHEN 650 MG: 325 TABLET ORAL at 03:25

## 2023-04-17 RX ADMIN — SODIUM CHLORIDE AND POTASSIUM CHLORIDE: 9; 1.49 INJECTION, SOLUTION INTRAVENOUS at 15:29

## 2023-04-17 RX ADMIN — SODIUM CHLORIDE AND POTASSIUM CHLORIDE: 9; 1.49 INJECTION, SOLUTION INTRAVENOUS at 06:53

## 2023-04-17 RX ADMIN — ACETAMINOPHEN 650 MG: 325 TABLET ORAL at 15:29

## 2023-04-17 RX ADMIN — SODIUM CHLORIDE AND POTASSIUM CHLORIDE: 9; 1.49 INJECTION, SOLUTION INTRAVENOUS at 23:51

## 2023-04-17 RX ADMIN — ACETAMINOPHEN 650 MG: 325 TABLET ORAL at 09:27

## 2023-04-17 ASSESSMENT — ACTIVITIES OF DAILY LIVING (ADL)
ADLS_ACUITY_SCORE: 35

## 2023-04-17 NOTE — PROGRESS NOTES
[facility]    Hospitalist Progress Note      Assessment & Plan   Cristian Antonio is a 62 year old male admitted on 4/16/2023 with n/v, diarrhea    N/V/D  Abnormal LFTs  Leukocytosis, procalcitonin 34.  Doesn't regularly follow with providers. Had [place] 4/12, since then developed abdominal pain, n/v. Then developed diarrhea. Diarrhea has improved but still some nv. Episode of chills. No fevers or bloody stools. Tolerated meal in ED,In ED tachy 120s, BPs stable. Creat 3.45, Na 131, WBC normal, bili 1.4., Alt 56. CXR clear.   - symptomatic treatment  - repeat LFTs in am now normal except alk ptase 150; lipase WNL  -White count in a.m. increased to 14.5, procalcitonin 33.93, UA without significant pyuria.  CXR negative.  Clinically appears consistent with foodborne gastroenteritis however procalcitonin 33.9 and elevated white count, will initiate empiric antibiotics, BC x2, follow temp profile and CBC.  Repeat LFTs in AM.      SANDHYA  Hyponatremia  Anion gap acidosis  Creatinine baseline previous to have been 1-1.1. On presentation at 3.45. NA sl low at 131 on presentation. Hopeful SANDHYA 2/2 dehydration, possible ATN as well. Lower suspicion for chronic intrinsic disease but possible w/ no recent creatinine.   Repeat CR this morning improved 2.64, admission 3.45, continue IVF.  UA fairly bland as above.  BMP in AM.  -     Thrombocytopenia  Platelets at 85 on presentation, baseline 2021 233.   -Repeat platelets 71, no signs of active bleeding, monitor.  -CBC in AM    DVT Prophylaxis: Ambulate every shift  Code Status: Full Code     Expected Discharge Date: 04/18/2023               Chandrakant Du MD  Text Page (7am - 6pm, M-F)    Total time 50 minutes for today 4/17/2023 :   time consisted of the following, assuming Patient care with review of records including labs, imaging results, medications, interdisciplinary notes; examination of Patient;  and completing documentation and orders.  Care  Management included counseling/discussion with Patient and Wife through ALS  regarding current condition including N/V/D; elevated WBC and ProCal, and Coordination of Care time with Nursing regarding infectious w/u and  management and surveillance.     Interval History   Persistent nausea and emesis, diarrhea improved, continued abdominal cramping.  Is up and about walking.  Tries to eat but limited.  Before onset of nausea, emesis, diarrhea after eating out states he has generally been well.    SH: No tobacco.  Lives with wife who uses Sign Language    ROS: Complete ROS negative except as above.     -Data reviewed today: I reviewed all new labs and imaging results over the last 24 hours.    Physical Exam   Temp: 97.7  F (36.5  C) Temp src: Oral BP: (!) 144/92 Pulse: 79   Resp: 18 SpO2: 97 % O2 Device: None (Room air)    Vitals:    04/16/23 2301   Weight: 109.5 kg (241 lb 6.4 oz)     Vital Signs with Ranges  Temp:  [97.2  F (36.2  C)-98.4  F (36.9  C)] 97.7  F (36.5  C)  Pulse:  [] 79  Resp:  [18-24] 18  BP: (119-151)/(79-99) 144/92  SpO2:  [97 %] 97 %  No intake/output data recorded.    General/Constitutional:   NAD, alert, calm, cooperative.  Uses sign language to communicate.  Chest/Respiratory:  Respirations nonlabored room air   Cardiovascular:  no murmur appreciated.  LE edema none  Gastrointestinal/Abdomen:  soft, nontender,no rebound, guarding or other peritoneal signs.  Neuro.  Gross motor tested, nonfocal,  Psych oriented, affect calm       Medications     0.9% sodium chloride + KCl 20 mEq/L 125 mL/hr at 04/17/23 0653       piperacillin-tazobactam  3.375 g Intravenous Q6H     sodium chloride (PF)  3 mL Intracatheter Q8H       Data   Recent Labs   Lab 04/17/23  0643 04/16/23  1733   WBC 14.5* 7.2   HGB 15.4 17.7   MCV 82 81   PLT 71* 85*   * 131*   POTASSIUM 3.7 3.7   CHLORIDE 103 93*   CO2 20* 20*   BUN 68.3* 81.4*   CR 2.64* 3.45*   ANIONGAP 12 18*   EMILIA 8.5* 9.0   * 107*    ALBUMIN 2.9* 3.4*   PROTTOTAL 6.2* 7.3   BILITOTAL 1.2 1.4*   ALKPHOS 150* 142*   ALT 50 56*   AST 35 49   LIPASE  --  13       Recent Results (from the past 24 hour(s))   Chest XR,  PA & LAT    Narrative    EXAM: XR CHEST 2 VIEWS  LOCATION: [facility]  DATE/TIME: 4/16/2023 7:16 PM CDT    INDICATION: chills  COMPARISON: None.      Impression    IMPRESSION: No pleural fluid or pneumothorax. No airspace disease or edema. Normal size of the heart.

## 2023-04-17 NOTE — PHARMACY-ADMISSION MEDICATION HISTORY
Pharmacist Admission Medication History    Admission medication history is complete. The information provided in this note is only as accurate as the sources available at the time of the update.    Medication reconciliation/reorder completed by provider prior to medication history? No    Information Source(s): Patient and CareEverywhere/SureScripts via in-person    Pertinent Information: Collected through help of a .    Changes made to PTA medication list:    Added: Tylenol    Deleted: None    Changed: None    Medication Affordability:  Not including over the counter (OTC) medications, was there a time in the past 12 months when you did not take your medications as prescribed because of cost?: No    Allergies reviewed with patient and updates made in EHR: yes    Medication History Completed By: Js Gibbs RP 4/16/2023 9:50 PM    PTA Med List   Medication Sig Last Dose     acetaminophen (TYLENOL) 650 MG CR tablet Take 650 mg by mouth every 8 hours as needed for mild pain or fever 4/16/2023 at am

## 2023-04-17 NOTE — PROGRESS NOTES
Pt A&O x4, deaf, needs , able to write and use call light appropriately. Indep. VSS.RA. Abd pain and headache managed with Tyl. IV infusing. Numbness to BLE. Tolerated regular diet well, denied n/v. UA done. No BM, enteric stool test pending.

## 2023-04-17 NOTE — ED NOTES
[facility]  ED Nurse Handoff Report    ED Chief complaint: Abdominal Pain and Nausea, Vomiting, & Diarrhea      ED Diagnosis:   Final diagnoses:   None       Code Status: Assumed full - admitting MD to establish with patient.    Allergies:   Allergies   Allergen Reactions    Banana Nausea    Ibuprofen        Patient Story: Pt states having abd pain and n/v/d    Focused Assessment: Intermittent abdominal pain; AxOx4, VSS    Treatments and/or interventions provided: IV placed, labs, imaging    Patient's response to treatments and/or interventions: See results; negative CHX    To be done/followed up on inpatient unit:  See orders    Does this patient have any cognitive concerns?:  No    Activity level - Baseline/Home:  Independent  Activity Level - Current:   Independent    Patient's Preferred language: American Sign Language   Needed?: Yes    Isolation: None  Infection: Not Applicable  Patient tested for COVID 19 prior to admission: YES  Bariatric?: No    Vital Signs:   Vitals:    04/16/23 1713   BP: 119/79   Pulse: (!) 124   Resp: 24   Temp: 97.2  F (36.2  C)   TempSrc: Oral   SpO2: 97%       Cardiac Rhythm:     Was the PSS-3 completed:   Yes  What interventions are required if any?               Family Comments:   OBS brochure/video discussed/provided to patient/family: N/A              Name of person given brochure if not patient:               Relationship to patient:     For the majority of the shift this patient's behavior was Green.   No behavioral interventions performed.    ED NURSE PHONE NUMBER: *[phone]

## 2023-04-17 NOTE — PROGRESS NOTES
RECEIVING UNIT ED HANDOFF REVIEW    ED Nurse Handoff Report was reviewed by: Jazz Sommer RN on April 16, 2023 at 10:12 PM

## 2023-04-17 NOTE — PROGRESS NOTES
Patient A&O x4. Patient is deaf and needs an . VSS on RA. Complained of stomach ache and controlled with PRN Tylenol. Right PIV infusing NS with KCl 20 mEq @ 125mL/hr. Denied nausea and vomiting. Mild abdominal tenderness upon palpation. Continent of B&B. Regular diet. Independent in room. Discharge pending.

## 2023-04-17 NOTE — H&P
[facility]    History and Physical - Hospitalist Service       Date of Admission:  4/16/2023    Assessment & Plan      Cristian Antonio is a 62 year old male admitted on 4/16/2023. He presents with n/v, diarrhea    History was obtained with the assistance of a     Gastroenteritis  Abnormal LFTs  Doesn't regularly follow with providers. Had [place] 4/12, developed abdominal pain, n/v. Then developed diarrhea. Diarrhea has improved but still some nv. Episode of chills. No fevers or bloody stools. Tolerated meal in ED, doing well in good spirits. In ED tachy 120s, BPs stable. Creat 3.45, Na 131, WBC normal, bili 1.4., Alt 56. CXR clear.   - symptomatic treatment  - repeat LFTs in am  - obtain RUQ US if persistent elevation in LFTs    SANDHYA  Hyponatremia  Anion gap acidosis  Creatinine baseline previous to have been 1-1.1. On presentation at 3.45. NA sl low at 131 on presentation. Hopeful SANDHYA 2/2 dehydration, possible ATN as well. Lower suspicion for chronic intrinsic disease but possible w/ no recent creatinine.   - IV fluids  - check UA  - repeat labs in am  - defer lactate as doing well and no abdominal pain  - nephrology involvement if renal function fails to improve    Thrombocytopenia  Platelets at 85 on presentation, baseline 2021 233.   - repeat in am     Diet:    Regular diet as tolerated  DVT Prophylaxis: Ambulate every shift  Ayala Catheter: Not present  Lines: None     Cardiac Monitoring: None  Code Status:   Full    Clinically Significant Risk Factors Present on Admission              # Hypoalbuminemia: Lowest albumin = 3.4 g/dL at 4/16/2023  5:33 PM, will monitor as appropriate   # Thrombocytopenia: Lowest platelets = 85 in last 2 days, will monitor for bleeding                Disposition Plan      Expected Discharge Date: 04/18/2023                  Jori Carrizales MD  Hospitalist Service  [facility]  Securely message with [employer] [place]  info)  Text page via [facility] Paging/Directory     ______________________________________________________________________    Chief Complaint   N/v, abdominal pain, diarrhea    History is obtained from the patient, electronic health record and emergency department physician    History of Present Illness   Cristian Antonio is a 62 year old male who presents with nausea, vomiting, diarrhea and abdominal pain.  Patient notes that 4 days ago he went to [place] for dinner.  2 hours after he got home he developed severe abdominal cramping.  Then he developed nausea and vomiting.  The nausea vomiting persisted through Thursday but improved by Friday to some degree.  Then on Friday he developed explosive diarrhea.  This persisted until Sunday morning when it improved.  He has been able to tolerate some small amounts of food but still has not been able to keep much water down.  His pain is currently resolved.  He notes he has been able to have some 7-Up and fruit but anytime he tries to drink water he throws it up.  He did admit episodes of hot and cold sweats where he had to change the sheets.  Denies any chest pain or shortness of breath.      Past Medical History    Past Medical History:   Diagnosis Date     CARDIOVASCULAR SCREENING; LDL GOAL LESS THAN 160 2/10/2010     Deaf 8/27/2014       Past Surgical History   No past surgical history on file.    Prior to Admission Medications   None        Review of Systems    The 10 point Review of Systems is negative other than noted in the HPI or here.     Social History   I have reviewed this patient's social history and updated it with pertinent information if needed.  Social History     Tobacco Use     Smoking status: Never     Smokeless tobacco: Never   Substance Use Topics     Alcohol use: No     Drug use: No        Physical Exam   Vital Signs: Temp: 97.2  F (36.2  C) Temp src: Oral BP: (!) 133/92 Pulse: (!) 124   Resp: 24 SpO2: 97 % O2 Device: None (Room air)    Weight: 0 lbs  0 oz    General Appearance: Alert, very pleasant, no distress  Respiratory: CTA B  Cardiovascular: RRR, no murmur, no edema  GI: soft, no tenderness, sl diminished BS  Skin: no rashes or lesions grossly    Other: CN grossly intact, HILLIARD     Medical Decision Making       50 MINUTES SPENT BY ME on the date of service doing chart review, history, exam, documentation & further activities per the note.      Data     I have personally reviewed the following data over the past 24 hrs:    7.2  \   17.7   / 85 (L)     131 (L) 93 (L) 81.4 (H) /  107 (H)   3.7 20 (L) 3.45 (H) \       ALT: 56 (H) AST: 49 AP: 142 (H) TBILI: 1.4 (H)   ALB: 3.4 (L) TOT PROTEIN: 7.3 LIPASE: 13       Imaging results reviewed over the past 24 hrs:   Recent Results (from the past 24 hour(s))   Chest XR,  PA & LAT    Narrative    EXAM: XR CHEST 2 VIEWS  LOCATION: [facility]  DATE/TIME: 4/16/2023 7:16 PM CDT    INDICATION: chills  COMPARISON: None.      Impression    IMPRESSION: No pleural fluid or pneumothorax. No airspace disease or edema. Normal size of the heart.

## 2023-04-18 ENCOUNTER — APPOINTMENT (OUTPATIENT)
Dept: ULTRASOUND IMAGING | Facility: CLINIC | Age: 63
DRG: 418 | End: 2023-04-18
Attending: STUDENT IN AN ORGANIZED HEALTH CARE EDUCATION/TRAINING PROGRAM
Payer: COMMERCIAL

## 2023-04-18 ENCOUNTER — APPOINTMENT (OUTPATIENT)
Dept: CT IMAGING | Facility: CLINIC | Age: 63
DRG: 418 | End: 2023-04-18
Attending: HOSPITALIST
Payer: COMMERCIAL

## 2023-04-18 LAB
ALBUMIN SERPL BCG-MCNC: 3 G/DL (ref 3.5–5.2)
ALP SERPL-CCNC: 218 U/L (ref 40–129)
ALT SERPL W P-5'-P-CCNC: 40 U/L (ref 10–50)
ANION GAP SERPL CALCULATED.3IONS-SCNC: 9 MMOL/L (ref 7–15)
AST SERPL W P-5'-P-CCNC: 23 U/L (ref 10–50)
BILIRUB DIRECT SERPL-MCNC: 0.59 MG/DL (ref 0–0.3)
BILIRUB SERPL-MCNC: 1.5 MG/DL
BUN SERPL-MCNC: 39 MG/DL (ref 8–23)
C COLI+JEJUNI+LARI FUSA STL QL NAA+PROBE: NOT DETECTED
CALCIUM SERPL-MCNC: 8.6 MG/DL (ref 8.8–10.2)
CHLORIDE SERPL-SCNC: 107 MMOL/L (ref 98–107)
CREAT SERPL-MCNC: 1.82 MG/DL (ref 0.67–1.17)
DEPRECATED HCO3 PLAS-SCNC: 21 MMOL/L (ref 22–29)
EC STX1 GENE STL QL NAA+PROBE: NOT DETECTED
EC STX2 GENE STL QL NAA+PROBE: NOT DETECTED
ERYTHROCYTE [DISTWIDTH] IN BLOOD BY AUTOMATED COUNT: 13.4 % (ref 10–15)
GFR SERPL CREATININE-BSD FRML MDRD: 41 ML/MIN/1.73M2
GLUCOSE SERPL-MCNC: 125 MG/DL (ref 70–99)
HCT VFR BLD AUTO: 42.5 % (ref 40–53)
HGB BLD-MCNC: 14.9 G/DL (ref 13.3–17.7)
MCH RBC QN AUTO: 29 PG (ref 26.5–33)
MCHC RBC AUTO-ENTMCNC: 35.1 G/DL (ref 31.5–36.5)
MCV RBC AUTO: 83 FL (ref 78–100)
NOROV GI+II ORF1-ORF2 JNC STL QL NAA+PR: NOT DETECTED
PLATELET # BLD AUTO: 84 10E3/UL (ref 150–450)
POTASSIUM SERPL-SCNC: 3.9 MMOL/L (ref 3.4–5.3)
PROT SERPL-MCNC: 6.4 G/DL (ref 6.4–8.3)
RBC # BLD AUTO: 5.13 10E6/UL (ref 4.4–5.9)
RVA NSP5 STL QL NAA+PROBE: NOT DETECTED
SALMONELLA SP RPOD STL QL NAA+PROBE: NOT DETECTED
SHIGELLA SP+EIEC IPAH STL QL NAA+PROBE: NOT DETECTED
SODIUM SERPL-SCNC: 137 MMOL/L (ref 136–145)
V CHOL+PARA RFBL+TRKH+TNAA STL QL NAA+PR: NOT DETECTED
WBC # BLD AUTO: 17.7 10E3/UL (ref 4–11)
Y ENTERO RECN STL QL NAA+PROBE: NOT DETECTED

## 2023-04-18 PROCEDURE — 76705 ECHO EXAM OF ABDOMEN: CPT

## 2023-04-18 PROCEDURE — 80053 COMPREHEN METABOLIC PANEL: CPT | Performed by: HOSPITALIST

## 2023-04-18 PROCEDURE — 250N000011 HC RX IP 250 OP 636: Performed by: INTERNAL MEDICINE

## 2023-04-18 PROCEDURE — 250N000013 HC RX MED GY IP 250 OP 250 PS 637: Performed by: INTERNAL MEDICINE

## 2023-04-18 PROCEDURE — 36415 COLL VENOUS BLD VENIPUNCTURE: CPT | Performed by: HOSPITALIST

## 2023-04-18 PROCEDURE — 250N000011 HC RX IP 250 OP 636: Performed by: HOSPITALIST

## 2023-04-18 PROCEDURE — 99233 SBSQ HOSP IP/OBS HIGH 50: CPT | Performed by: HOSPITALIST

## 2023-04-18 PROCEDURE — 85027 COMPLETE CBC AUTOMATED: CPT | Performed by: HOSPITALIST

## 2023-04-18 PROCEDURE — 99222 1ST HOSP IP/OBS MODERATE 55: CPT | Mod: 57 | Performed by: PHYSICIAN ASSISTANT

## 2023-04-18 PROCEDURE — 74177 CT ABD & PELVIS W/CONTRAST: CPT

## 2023-04-18 PROCEDURE — 120N000001 HC R&B MED SURG/OB

## 2023-04-18 PROCEDURE — 82248 BILIRUBIN DIRECT: CPT | Performed by: HOSPITALIST

## 2023-04-18 PROCEDURE — 250N000009 HC RX 250: Performed by: INTERNAL MEDICINE

## 2023-04-18 RX ORDER — IOPAMIDOL 755 MG/ML
121 INJECTION, SOLUTION INTRAVASCULAR ONCE
Status: COMPLETED | OUTPATIENT
Start: 2023-04-18 | End: 2023-04-18

## 2023-04-18 RX ADMIN — PIPERACILLIN AND TAZOBACTAM 3.38 G: 3; .375 INJECTION, POWDER, FOR SOLUTION INTRAVENOUS at 13:22

## 2023-04-18 RX ADMIN — SODIUM CHLORIDE 75 ML: 9 INJECTION, SOLUTION INTRAVENOUS at 09:00

## 2023-04-18 RX ADMIN — PIPERACILLIN AND TAZOBACTAM 3.38 G: 3; .375 INJECTION, POWDER, FOR SOLUTION INTRAVENOUS at 00:04

## 2023-04-18 RX ADMIN — SODIUM CHLORIDE AND POTASSIUM CHLORIDE: 9; 1.49 INJECTION, SOLUTION INTRAVENOUS at 08:47

## 2023-04-18 RX ADMIN — ACETAMINOPHEN 650 MG: 325 TABLET ORAL at 21:46

## 2023-04-18 RX ADMIN — ACETAMINOPHEN 650 MG: 325 TABLET ORAL at 06:35

## 2023-04-18 RX ADMIN — IOPAMIDOL 121 ML: 755 INJECTION, SOLUTION INTRAVENOUS at 09:00

## 2023-04-18 RX ADMIN — ACETAMINOPHEN 650 MG: 325 TABLET ORAL at 14:26

## 2023-04-18 RX ADMIN — PIPERACILLIN AND TAZOBACTAM 3.38 G: 3; .375 INJECTION, POWDER, FOR SOLUTION INTRAVENOUS at 18:32

## 2023-04-18 RX ADMIN — PIPERACILLIN AND TAZOBACTAM 3.38 G: 3; .375 INJECTION, POWDER, FOR SOLUTION INTRAVENOUS at 06:30

## 2023-04-18 ASSESSMENT — ACTIVITIES OF DAILY LIVING (ADL)
ADLS_ACUITY_SCORE: 35

## 2023-04-18 NOTE — PROGRESS NOTES
Patient A/O, deaf to both ears but was able to make needs known through writing. In a good spirit and watching basketball until after 2300. Reported mild abdominal cramping, had a smear of bowel movement. Stool sample sent to lab, result pending. No fever or chills, denies feelings of general discomfort. Now sleeping, will keep monitoring.

## 2023-04-18 NOTE — PROGRESS NOTES
[facility]    Hospitalist Progress Note      Assessment & Plan   Cristian Antonio is a 62 year old male admitted on 4/16/2023 with n/v, diarrhea    N/V/D  Abnormal LFTs  Leukocytosis, procalcitonin 34.  Acute Cholecystitis per CT 4/18/23  Doesn't regularly follow with providers. Had [place] 4/12, since then developed abdominal pain, n/v. Then developed diarrhea. Diarrhea has improved but still some nv. Episode of chills. No fevers or bloody stools. Tolerated meal in ED,In ED tachy 120s, BPs stable. Creat 3.45, Na 131, WBC normal, bili 1.4., Alt 56. CXR clear.   - symptomatic treatment  - repeat LFTs in am now normal except alk ptase 150; lipase WNL  -White count in a.m. increased to 14.5, procalcitonin 33.93, UA without significant pyuria.  CXR negative.  Clinically appears consistent with foodborne gastroenteritis however procalcitonin 33.9 and elevated white count, will initiate empiric antibiotics, BC x2, follow temp profile and CBC.  Repeat LFTs in AM.  -4/18/2023 WBC further increased to 17.7, composite placed to 18, total bilirubin 1.5.  CT abdomen pelvis obtained consistent with acute cholecystitis.  Discussed results with patient through sign .  Patient taking p.o.'s, no further nausea/emesis, formed stool, afebrile.  Continue Zosyn preop.  Asked how he communicates with his wife he says by phone which he does not have, nursing will plan to contact wife to update and if she wishes to communicate with patient.  Patient made n.p.o., general surgery consulted.  See note 4/18/23, plan laparoscopic cholecystectomy tomorrow 4/19/23, clear liquids for now, n.p.o. after midnight.     SANDHYA  Hyponatremia  Anion gap acidosis  Creatinine baseline previous to have been 1-1.1. On presentation at 3.45. NA sl low at 131 on presentation. Hopeful SANDHYA 2/2 dehydration, possible ATN as well. Lower suspicion for chronic intrinsic disease but possible w/ no recent creatinine.   Repeat CR this  morning improved 2.64, admission 3.45, continue IVF.  UA fairly bland as above.    -4/18/2023 CR improved but still elevated 1.82, given IV contrast load this morning, continue  cc/hour with KCl 20 mill equivalents especially given n.p.o. status after midnight for planned surgery tomorrow..  BMP in AM.  -     Thrombocytopenia  Platelets at 85 on presentation, baseline 2021 233.   -Repeat platelets 71, no signs of active bleeding, monitor.  -4/18/2023 platelets 84, no signs of active bleeding, recheck CBC with platelets in a.m. for planned surgery.    DVT Prophylaxis: Ambulate every shift  Code Status: Full Code     Expected Discharge Date: 04/19/2023               Chandrakant Du MD  Text Page (7am - 6pm, M-F)    Total time 50 minutes for today 4/18/2023 :  time consisted of the following, examination of patient, review of records including labs, imaging results, medications, interdisciplinary notes and completing documentation and orders.  Care Management included counseling/discussion with Patient regarding current condition including CT results with acute cholecystitis and General surgery consult and Coordination of Care time with Nursing regarding CT results, fluid management and Specialists, General Surgery regarding surgery care plan, management and surveillance.      Interval History   History through sign .  On morning encounters no further nausea/emesis, eating.  Formed stool.  Afebrile.  Denies abdominal pain.  See CT results, denies prior surgical procedure or anesthesia.  No family history of anesthesia problems.  No comorbidities except for on this admission SANDHYA responded to fluids however dye load this morning, moderate thrombocytopenia.      SH: No tobacco.  Lives with wife who uses Sign Language    ROS: Complete ROS negative except as above.     -Data reviewed today: I reviewed all new labs and imaging results over the last 24 hours.    Physical Exam   Temp: 98.6  F (37  C) Temp  src: Oral BP: (!) 137/91 Pulse: 74   Resp: 16 SpO2: 96 % O2 Device: None (Room air)    Vitals:    04/16/23 2301   Weight: 109.5 kg (241 lb 6.4 oz)     Vital Signs with Ranges  Temp:  [98.1  F (36.7  C)-99.2  F (37.3  C)] 98.6  F (37  C)  Pulse:  [74-78] 74  Resp:  [16] 16  BP: (129-154)/(78-99) 137/91  SpO2:  [96 %-97 %] 96 %  I/O last 3 completed shifts:  In: 840 [P.O.:840]  Out: -     General/Constitutional:    NAD, alert, calm, cooperative  .  Uses sign language to communicate.  Chest/Respiratory:  Respirations nonlabored room air   Cardiovascular:  no murmur appreciated.  LE edema none  Gastrointestinal/Abdomen: Soft, nontender, no rebound, guarding or other peritoneal signs.    Neuro.  Gross motor tested, nonfocal,  Psych oriented, affect calm.      Medications     0.9% sodium chloride + KCl 20 mEq/L 125 mL/hr at 04/18/23 1048       piperacillin-tazobactam  3.375 g Intravenous Q6H     sodium chloride (PF)  3 mL Intracatheter Q8H       Data   Recent Labs   Lab 04/18/23  0637 04/17/23  0643 04/16/23  1733   WBC 17.7* 14.5* 7.2   HGB 14.9 15.4 17.7   MCV 83 82 81   PLT 84* 71* 85*    135* 131*   POTASSIUM 3.9 3.7 3.7   CHLORIDE 107 103 93*   CO2 21* 20* 20*   BUN 39.0* 68.3* 81.4*   CR 1.82* 2.64* 3.45*   ANIONGAP 9 12 18*   EMILIA 8.6* 8.5* 9.0   * 147* 107*   ALBUMIN 3.0* 2.9* 3.4*   PROTTOTAL 6.4 6.2* 7.3   BILITOTAL 1.5* 1.2 1.4*   ALKPHOS 218* 150* 142*   ALT 40 50 56*   AST 23 35 49   LIPASE  --   --  13       Recent Results (from the past 24 hour(s))   CT Abdomen Pelvis w Contrast    Narrative    CT ABDOMEN AND PELVIS WITH CONTRAST 4/18/2023 9:09 AM    CLINICAL HISTORY: Abdominal pain. Nausea and vomiting. Elevated  LFT/ProCal 33.    TECHNIQUE: CT scan of the abdomen and pelvis was performed following  injection of IV contrast. Multiplanar reformats were obtained. Dose  reduction techniques were used.  CONTRAST: 121 mL Isovue-[id number]    COMPARISON: None.    FINDINGS:   LOWER CHEST: No infiltrates or  effusions.    HEPATOBILIARY: Marked wall thickening of the gallbladder and  surrounding inflammatory change compatible with acute cholecystitis.  No calcified gallstones. Low-attenuation subcentimeter liver lesion(s)  compatible with benign cysts or other benign lesions. No specific  evaluation or follow-up is recommended in a low risk patient.    PANCREAS: Fatty atrophy. No ductal dilatation or definite mass.    SPLEEN: Normal size.    ADRENAL GLANDS: No significant nodules.    KIDNEYS/BLADDER: No significant mass, stones, or hydronephrosis. There  are simple or benign cysts. No follow up is needed.    BOWEL: No obstruction or inflammatory change.    PELVIC ORGANS: No pelvic masses.    ADDITIONAL FINDINGS: No ascites.    MUSCULOSKELETAL: No frankly destructive bony lesions.      Impression    IMPRESSION: Acute cholecystitis.    Results called to Dr. Du on April 18, 2023 at 9:27 AM.   US Abdomen Limited    Narrative    US ABDOMEN LIMITED 4/18/2023 12:30 PM    CLINICAL HISTORY: Nausea and vomiting, elevated liver labs  TECHNIQUE: Limited abdominal ultrasound.    COMPARISON: Same date CT    FINDINGS:    GALLBLADDER: Gallbladder is distended with internal cholelithiasis.  There is diffuse wall thickening without definite pericholecystic  fluid. Sonographic Rios sign is reportedly negative.    BILE DUCTS: There is no intrahepatic biliary dilatation. The common  duct measures 5 mm.    LIVER: Increased echogenicity with decreased conspicuity of portal  triads.    RIGHT KIDNEY: No hydronephrosis. Likely cyst with possible internal  septations measuring up to 1.4 cm.    PANCREAS: The pancreas is largely obscured by overlying gas.    No ascites.      Impression    IMPRESSION:  1.  Findings suspicious for acute cholecystitis, similar to same day  CT.  2.  No sonographic evidence of biliary obstruction.  3.  Likely hepatic steatosis.  4.  Likely small right renal cyst with possible thin internal  septation. Consider  further evaluation with contrast-enhanced MRI on a  nonemergent basis.    DASH RESENDIZ MD         SYSTEM ID:  [id number]

## 2023-04-18 NOTE — PROGRESS NOTES
A&O x4, deaf, needs . Independent in room. VSS on RA. Denies abdominal pain, nausea/vomitting, diarrhea. Had formed soft stool x2 today. C/o headache managed with prn tylenol. PIV infusing. Clear liquid diet. Will be NPO at midnight for [place] surgery tomorrow. CT and US completed.

## 2023-04-18 NOTE — CONSULTS
[facility] General Surgery Consultation    Cristian Antonio MRN# [id number]   YOB: 1960 Age: 62 year old      Date of Admission:  4/16/2023  Date of Consult: 4/18/2023         Assessment and Plan:   Patient is a 62 year old male with abdominal pain/nausea/vomiting/diarrhea and gallbladder wall thickening on CT concerning for acute cholecystitis in the setting of elevated LFTs and leukocytosis. Abdominal ultrasound is consistent with CT and no CBD obstruction is noted.     PLAN:    Laparoscopic cholecystectomy tomorrow.  Continue Zosyn  May have clears tonight  NPO after MN  Recheck LFT's in am     The pathophysiology of gallbladder disease and complications of cholecystitis and choledocholithiasis were discussed with the patient. The risks associated with the procedure including, but not limited to, infection, bleeding, bile leak, bile duct injury, retained gallstones, drain placement, conversion to open and anesthesia complications were discussed with the patient.  Possible complications requiring further surgical intervention during or after the procedure were also discussed. The patient indicated understanding of the discussion, asked appropriate questions, and wishes to proceed tomorrow as scheduling allows.         Requesting Physician:      Dr. Carrizales        Chief Complaint:     Chief Complaint   Patient presents with     Abdominal Pain     Nausea, Vomiting, & Diarrhea          History of Present Illness:   Cristian Antonio is a 62 year old male who presented to ED on 4/16 for abdominal pain, nausea, and diarrhea. History is obtained via chart review and from patient with the assistance of . Last week on 4/12 patient developed cramping abdominal pain following large meal at [place]. The pain was throughout the abdomen and associated with nausea and vomiting. The following day he developed diarrhea. He had associated chills and rigors. Since symptoms didn't  "subside he presented to the ED and was noted to be tachycardic with SANDHYA (elevated Cr 3.45) likely secondary to dehydration. Labs were notable for normal white count, thrombocytopenia with plt 85, and elevated bilirubin 1.4. He had only mild ALT and alk phos elevation with normal AST and lipase. Bilirubin normalized yesterday and now up to 1.5 (direct 0.59). Also now has leukocytosis WBC count 7.2->14.5. Preliminary blood cultures without growth. Procal elevated 33.93 yesterday, CXR unremarkable.    CT scan was obtained and visualized marked wall thickening of the gallbladder and surrounding inflammatory change compatible with acute cholecystitis. There were no calcified gallstones. Low-attenuation subcentimeter liver lesions compatible with benign cysts or other benign lesion. Patient is undergoing abdominal ultrasound today. Patient does not regularly follow with a physician and denies having medical conditions. He has not had prior surgeries and does not take blood thinning products.          Physical Exam:   Blood pressure (!) 137/91, pulse 74, temperature 98.6  F (37  C), temperature source Oral, resp. rate 16, height 1.956 m (6' 5\"), weight 109.5 kg (241 lb 6.4 oz), SpO2 96 %.  241 lbs 6.4 oz  General: Vital signs reviewed, in no apparent distress  Eyes: Anicteric  HENT: Normocephalic, atraumatic, trachea midline, poor dentition.   Respiratory: Breathing nonlabored, CTAB  Cardiovascular: Regular rate and rhythm  GI: Abdomen normal bowel sounds, soft, nondistended, firm and tender at right subcostal area.   Musculoskeletal: No gross deformities  Neurologic: Grossly nonfocal exam  Psychiatric: Normal mood, affect and insight  Integumentary: Warm and dry         Past Medical History:     Past Medical History:   Diagnosis Date     CARDIOVASCULAR SCREENING; LDL GOAL LESS THAN 160 2/10/2010     Deaf 8/27/2014            Past Surgical History:   No past surgical history.         Current Medications:           " "piperacillin-tazobactam  3.375 g Intravenous Q6H     sodium chloride (PF)  3 mL Intracatheter Q8H       acetaminophen **OR** acetaminophen, lidocaine 4%, lidocaine (buffered or not buffered), melatonin, ondansetron **OR** ondansetron, polyethylene glycol, senna-docusate **OR** senna-docusate, sodium chloride (PF)         Home Medications:     Prior to Admission medications    Medication Sig Last Dose Taking? Auth Provider Long Term End Date   acetaminophen (TYLENOL) 650 MG CR tablet Take 650 mg by mouth every 8 hours as needed for mild pain or fever 2023 at am Yes Unknown, Entered By History              Allergies:     Allergies   Allergen Reactions     Banana Nausea     Ibuprofen Other (See Comments)     Uncontrollable shaking for approx. 20 minutes - almost \"seizure-like\"            Family History:     Parents both lived long lives and  of \"natural causes\" / \"old age\"  No FH of gall bladder disease.  Family History   Problem Relation Age of Onset     Diabetes No family hx of      Cancer - colorectal No family hx of            Social History:   Cristian Antonio  reports that he has never smoked. He has never used smokeless tobacco. He reports that he rarely drinks alcohol and does not use drugs.          Review of Systems:   The 12 point Review of Systems is negative other than noted in the HPI.         Labs/Imaging   All new lab and imaging data was reviewed.   Recent Labs   Lab 23  0637 23  0643 23  1733   WBC 17.7* 14.5* 7.2   HGB 14.9 15.4 17.7   HCT 42.5 43.5 48.9   MCV 83 82 81   PLT 84* 71* 85*     Recent Labs   Lab 23  0637 23  0643 23  1733    135* 131*   POTASSIUM 3.9 3.7 3.7   CHLORIDE 107 103 93*   CO2 21* 20* 20*   ANIONGAP 9 12 18*   * 147* 107*   BUN 39.0* 68.3* 81.4*   CR 1.82* 2.64* 3.45*   GFRESTIMATED 41* 26* 19*   EMILIA 8.6* 8.5* 9.0   PROTTOTAL 6.4 6.2* 7.3   ALBUMIN 3.0* 2.9* 3.4*   BILITOTAL 1.5* 1.2 1.4*   ALKPHOS 218* 150* 142*   AST 23 " 35 49   ALT 40 50 56*       I have personally reviewed the imaging studies-     CT ABDOMEN PELVIS WITH CONTRAST    FINDINGS:   LOWER CHEST: No infiltrates or effusions.     HEPATOBILIARY: Marked wall thickening of the gallbladder and  surrounding inflammatory change compatible with acute cholecystitis.  No calcified gallstones. Low-attenuation subcentimeter liver lesion(s)  compatible with benign cysts or other benign lesions. No specific  evaluation or follow-up is recommended in a low risk patient.     PANCREAS: Fatty atrophy. No ductal dilatation or definite mass.     SPLEEN: Normal size.     ADRENAL GLANDS: No significant nodules.     KIDNEYS/BLADDER: No significant mass, stones, or hydronephrosis. There  are simple or benign cysts. No follow up is needed.     BOWEL: No obstruction or inflammatory change.     PELVIC ORGANS: No pelvic masses.     ADDITIONAL FINDINGS: No ascites.     MUSCULOSKELETAL: No frankly destructive bony lesions.                                                                      IMPRESSION: Acute cholecystitis.      US ABDOMEN LIMITED 4/18/2023 12:30 PM     CLINICAL HISTORY: Nausea and vomiting, elevated liver labs  TECHNIQUE: Limited abdominal ultrasound.     COMPARISON: Same date CT     FINDINGS:     GALLBLADDER: Gallbladder is distended with internal cholelithiasis.  There is diffuse wall thickening without definite pericholecystic  fluid. Sonographic Rios sign is reportedly negative.     BILE DUCTS: There is no intrahepatic biliary dilatation. The common  duct measures 5 mm.     LIVER: Increased echogenicity with decreased conspicuity of portal  triads.     RIGHT KIDNEY: No hydronephrosis. Likely cyst with possible internal  septations measuring up to 1.4 cm.     PANCREAS: The pancreas is largely obscured by overlying gas.     No ascites.                                                                      IMPRESSION:  1.  Findings suspicious for acute cholecystitis, similar to same  day  CT.  2.  No sonographic evidence of biliary obstruction.  3.  Likely hepatic steatosis.  4.  Likely small right renal cyst with possible thin internal  septation. Consider further evaluation with contrast-enhanced MRI on a  nonemergent basis.       Kwesi Blankenship PA-C    60 minutes spent on date of the encounter doing patient visit, chart review, and documentation.

## 2023-04-19 ENCOUNTER — ANESTHESIA (OUTPATIENT)
Dept: SURGERY | Facility: CLINIC | Age: 63
DRG: 418 | End: 2023-04-19
Payer: COMMERCIAL

## 2023-04-19 ENCOUNTER — OFFICE VISIT (OUTPATIENT)
Dept: INTERPRETER SERVICES | Facility: CLINIC | Age: 63
End: 2023-04-19
Payer: COMMERCIAL

## 2023-04-19 ENCOUNTER — APPOINTMENT (OUTPATIENT)
Dept: SURGERY | Facility: PHYSICIAN GROUP | Age: 63
End: 2023-04-19
Payer: COMMERCIAL

## 2023-04-19 ENCOUNTER — ANESTHESIA EVENT (OUTPATIENT)
Dept: SURGERY | Facility: CLINIC | Age: 63
DRG: 418 | End: 2023-04-19
Payer: COMMERCIAL

## 2023-04-19 LAB
ALBUMIN SERPL BCG-MCNC: 2.7 G/DL (ref 3.5–5.2)
ALBUMIN SERPL BCG-MCNC: 2.8 G/DL (ref 3.5–5.2)
ALP SERPL-CCNC: 146 U/L (ref 40–129)
ALP SERPL-CCNC: 198 U/L (ref 40–129)
ALT SERPL W P-5'-P-CCNC: 38 U/L (ref 10–50)
ALT SERPL W P-5'-P-CCNC: 43 U/L (ref 10–50)
ANION GAP SERPL CALCULATED.3IONS-SCNC: 9 MMOL/L (ref 7–15)
AST SERPL W P-5'-P-CCNC: 26 U/L (ref 10–50)
AST SERPL W P-5'-P-CCNC: 35 U/L (ref 10–50)
BILIRUB DIRECT SERPL-MCNC: 0.36 MG/DL (ref 0–0.3)
BILIRUB DIRECT SERPL-MCNC: 0.52 MG/DL (ref 0–0.3)
BILIRUB SERPL-MCNC: 0.8 MG/DL
BILIRUB SERPL-MCNC: 1.4 MG/DL
BUN SERPL-MCNC: 23.1 MG/DL (ref 8–23)
CALCIUM SERPL-MCNC: 8.5 MG/DL (ref 8.8–10.2)
CHLORIDE SERPL-SCNC: 106 MMOL/L (ref 98–107)
CREAT SERPL-MCNC: 1.46 MG/DL (ref 0.67–1.17)
DEPRECATED HCO3 PLAS-SCNC: 23 MMOL/L (ref 22–29)
ERYTHROCYTE [DISTWIDTH] IN BLOOD BY AUTOMATED COUNT: 13.6 % (ref 10–15)
ERYTHROCYTE [DISTWIDTH] IN BLOOD BY AUTOMATED COUNT: 13.7 % (ref 10–15)
GFR SERPL CREATININE-BSD FRML MDRD: 54 ML/MIN/1.73M2
GLUCOSE BLDC GLUCOMTR-MCNC: 135 MG/DL (ref 70–99)
GLUCOSE SERPL-MCNC: 119 MG/DL (ref 70–99)
HCT VFR BLD AUTO: 40.8 % (ref 40–53)
HCT VFR BLD AUTO: 43.1 % (ref 40–53)
HGB BLD-MCNC: 13.7 G/DL (ref 13.3–17.7)
HGB BLD-MCNC: 14.7 G/DL (ref 13.3–17.7)
MCH RBC QN AUTO: 28.8 PG (ref 26.5–33)
MCH RBC QN AUTO: 29.1 PG (ref 26.5–33)
MCHC RBC AUTO-ENTMCNC: 33.6 G/DL (ref 31.5–36.5)
MCHC RBC AUTO-ENTMCNC: 34.1 G/DL (ref 31.5–36.5)
MCV RBC AUTO: 84 FL (ref 78–100)
MCV RBC AUTO: 87 FL (ref 78–100)
PLATELET # BLD AUTO: 118 10E3/UL (ref 150–450)
PLATELET # BLD AUTO: 162 10E3/UL (ref 150–450)
POTASSIUM SERPL-SCNC: 3.9 MMOL/L (ref 3.4–5.3)
PROT SERPL-MCNC: 5.9 G/DL (ref 6.4–8.3)
PROT SERPL-MCNC: 6.3 G/DL (ref 6.4–8.3)
RBC # BLD AUTO: 4.71 10E6/UL (ref 4.4–5.9)
RBC # BLD AUTO: 5.11 10E6/UL (ref 4.4–5.9)
SODIUM SERPL-SCNC: 138 MMOL/L (ref 136–145)
WBC # BLD AUTO: 14.4 10E3/UL (ref 4–11)
WBC # BLD AUTO: 14.8 10E3/UL (ref 4–11)

## 2023-04-19 PROCEDURE — 120N000001 HC R&B MED SURG/OB

## 2023-04-19 PROCEDURE — 250N000013 HC RX MED GY IP 250 OP 250 PS 637: Performed by: PHYSICIAN ASSISTANT

## 2023-04-19 PROCEDURE — 250N000011 HC RX IP 250 OP 636: Performed by: HOSPITALIST

## 2023-04-19 PROCEDURE — 360N000084 HC SURGERY LEVEL 4 W/ FLUORO, PER MIN: Performed by: STUDENT IN AN ORGANIZED HEALTH CARE EDUCATION/TRAINING PROGRAM

## 2023-04-19 PROCEDURE — 88304 TISSUE EXAM BY PATHOLOGIST: CPT | Mod: TC | Performed by: STUDENT IN AN ORGANIZED HEALTH CARE EDUCATION/TRAINING PROGRAM

## 2023-04-19 PROCEDURE — 99232 SBSQ HOSP IP/OBS MODERATE 35: CPT | Performed by: HOSPITALIST

## 2023-04-19 PROCEDURE — 250N000013 HC RX MED GY IP 250 OP 250 PS 637: Performed by: STUDENT IN AN ORGANIZED HEALTH CARE EDUCATION/TRAINING PROGRAM

## 2023-04-19 PROCEDURE — 250N000009 HC RX 250: Performed by: NURSE ANESTHETIST, CERTIFIED REGISTERED

## 2023-04-19 PROCEDURE — T1013 SIGN LANG/ORAL INTERPRETER: HCPCS | Mod: U3

## 2023-04-19 PROCEDURE — 85027 COMPLETE CBC AUTOMATED: CPT | Performed by: HOSPITALIST

## 2023-04-19 PROCEDURE — 250N000025 HC SEVOFLURANE, PER MIN: Performed by: STUDENT IN AN ORGANIZED HEALTH CARE EDUCATION/TRAINING PROGRAM

## 2023-04-19 PROCEDURE — 258N000003 HC RX IP 258 OP 636: Performed by: NURSE ANESTHETIST, CERTIFIED REGISTERED

## 2023-04-19 PROCEDURE — 710N000009 HC RECOVERY PHASE 1, LEVEL 1, PER MIN: Performed by: STUDENT IN AN ORGANIZED HEALTH CARE EDUCATION/TRAINING PROGRAM

## 2023-04-19 PROCEDURE — 82248 BILIRUBIN DIRECT: CPT | Performed by: PHYSICIAN ASSISTANT

## 2023-04-19 PROCEDURE — 272N000001 HC OR GENERAL SUPPLY STERILE: Performed by: STUDENT IN AN ORGANIZED HEALTH CARE EDUCATION/TRAINING PROGRAM

## 2023-04-19 PROCEDURE — 258N000003 HC RX IP 258 OP 636: Performed by: ANESTHESIOLOGY

## 2023-04-19 PROCEDURE — 85027 COMPLETE CBC AUTOMATED: CPT | Performed by: PHYSICIAN ASSISTANT

## 2023-04-19 PROCEDURE — 250N000009 HC RX 250: Performed by: STUDENT IN AN ORGANIZED HEALTH CARE EDUCATION/TRAINING PROGRAM

## 2023-04-19 PROCEDURE — 250N000011 HC RX IP 250 OP 636: Performed by: PHYSICIAN ASSISTANT

## 2023-04-19 PROCEDURE — 0FT44ZZ RESECTION OF GALLBLADDER, PERCUTANEOUS ENDOSCOPIC APPROACH: ICD-10-PCS | Performed by: STUDENT IN AN ORGANIZED HEALTH CARE EDUCATION/TRAINING PROGRAM

## 2023-04-19 PROCEDURE — 250N000011 HC RX IP 250 OP 636: Performed by: STUDENT IN AN ORGANIZED HEALTH CARE EDUCATION/TRAINING PROGRAM

## 2023-04-19 PROCEDURE — 250N000011 HC RX IP 250 OP 636: Performed by: NURSE ANESTHETIST, CERTIFIED REGISTERED

## 2023-04-19 PROCEDURE — 82248 BILIRUBIN DIRECT: CPT | Performed by: HOSPITALIST

## 2023-04-19 PROCEDURE — 999N000141 HC STATISTIC PRE-PROCEDURE NURSING ASSESSMENT: Performed by: STUDENT IN AN ORGANIZED HEALTH CARE EDUCATION/TRAINING PROGRAM

## 2023-04-19 PROCEDURE — 47562 LAPAROSCOPIC CHOLECYSTECTOMY: CPT | Performed by: STUDENT IN AN ORGANIZED HEALTH CARE EDUCATION/TRAINING PROGRAM

## 2023-04-19 PROCEDURE — 258N000001 HC RX 258: Performed by: STUDENT IN AN ORGANIZED HEALTH CARE EDUCATION/TRAINING PROGRAM

## 2023-04-19 PROCEDURE — 80053 COMPREHEN METABOLIC PANEL: CPT | Performed by: HOSPITALIST

## 2023-04-19 PROCEDURE — 370N000017 HC ANESTHESIA TECHNICAL FEE, PER MIN: Performed by: STUDENT IN AN ORGANIZED HEALTH CARE EDUCATION/TRAINING PROGRAM

## 2023-04-19 PROCEDURE — 36415 COLL VENOUS BLD VENIPUNCTURE: CPT | Performed by: PHYSICIAN ASSISTANT

## 2023-04-19 PROCEDURE — 88304 TISSUE EXAM BY PATHOLOGIST: CPT | Mod: 26 | Performed by: PATHOLOGY

## 2023-04-19 RX ORDER — FENTANYL CITRATE 0.05 MG/ML
25 INJECTION, SOLUTION INTRAMUSCULAR; INTRAVENOUS EVERY 5 MIN PRN
Status: DISCONTINUED | OUTPATIENT
Start: 2023-04-19 | End: 2023-04-19 | Stop reason: HOSPADM

## 2023-04-19 RX ORDER — SODIUM CHLORIDE, SODIUM LACTATE, POTASSIUM CHLORIDE, CALCIUM CHLORIDE 600; 310; 30; 20 MG/100ML; MG/100ML; MG/100ML; MG/100ML
INJECTION, SOLUTION INTRAVENOUS CONTINUOUS
Status: DISCONTINUED | OUTPATIENT
Start: 2023-04-19 | End: 2023-04-19 | Stop reason: HOSPADM

## 2023-04-19 RX ORDER — NALOXONE HYDROCHLORIDE 0.4 MG/ML
0.2 INJECTION, SOLUTION INTRAMUSCULAR; INTRAVENOUS; SUBCUTANEOUS
Status: DISCONTINUED | OUTPATIENT
Start: 2023-04-19 | End: 2023-04-22 | Stop reason: HOSPADM

## 2023-04-19 RX ORDER — FENTANYL CITRATE 0.05 MG/ML
50 INJECTION, SOLUTION INTRAMUSCULAR; INTRAVENOUS EVERY 5 MIN PRN
Status: DISCONTINUED | OUTPATIENT
Start: 2023-04-19 | End: 2023-04-19 | Stop reason: HOSPADM

## 2023-04-19 RX ORDER — NALOXONE HYDROCHLORIDE 0.4 MG/ML
0.4 INJECTION, SOLUTION INTRAMUSCULAR; INTRAVENOUS; SUBCUTANEOUS
Status: DISCONTINUED | OUTPATIENT
Start: 2023-04-19 | End: 2023-04-22 | Stop reason: HOSPADM

## 2023-04-19 RX ORDER — ONDANSETRON 2 MG/ML
4 INJECTION INTRAMUSCULAR; INTRAVENOUS EVERY 30 MIN PRN
Status: DISCONTINUED | OUTPATIENT
Start: 2023-04-19 | End: 2023-04-19 | Stop reason: HOSPADM

## 2023-04-19 RX ORDER — HYDROMORPHONE HCL IN WATER/PF 6 MG/30 ML
0.2 PATIENT CONTROLLED ANALGESIA SYRINGE INTRAVENOUS EVERY 5 MIN PRN
Status: DISCONTINUED | OUTPATIENT
Start: 2023-04-19 | End: 2023-04-19 | Stop reason: HOSPADM

## 2023-04-19 RX ORDER — HYDROMORPHONE HYDROCHLORIDE 1 MG/ML
INJECTION, SOLUTION INTRAMUSCULAR; INTRAVENOUS; SUBCUTANEOUS PRN
Status: DISCONTINUED | OUTPATIENT
Start: 2023-04-19 | End: 2023-04-19

## 2023-04-19 RX ORDER — CEFAZOLIN SODIUM/WATER 2 G/20 ML
2 SYRINGE (ML) INTRAVENOUS SEE ADMIN INSTRUCTIONS
Status: DISCONTINUED | OUTPATIENT
Start: 2023-04-19 | End: 2023-04-19 | Stop reason: HOSPADM

## 2023-04-19 RX ORDER — FENTANYL CITRATE 50 UG/ML
INJECTION, SOLUTION INTRAMUSCULAR; INTRAVENOUS PRN
Status: DISCONTINUED | OUTPATIENT
Start: 2023-04-19 | End: 2023-04-19

## 2023-04-19 RX ORDER — HYDROMORPHONE HCL IN WATER/PF 6 MG/30 ML
0.2 PATIENT CONTROLLED ANALGESIA SYRINGE INTRAVENOUS
Status: DISCONTINUED | OUTPATIENT
Start: 2023-04-19 | End: 2023-04-20

## 2023-04-19 RX ORDER — PROPOFOL 10 MG/ML
INJECTION, EMULSION INTRAVENOUS PRN
Status: DISCONTINUED | OUTPATIENT
Start: 2023-04-19 | End: 2023-04-19

## 2023-04-19 RX ORDER — ACETAMINOPHEN 325 MG/1
975 TABLET ORAL ONCE
Status: COMPLETED | OUTPATIENT
Start: 2023-04-19 | End: 2023-04-19

## 2023-04-19 RX ORDER — LIDOCAINE HYDROCHLORIDE 20 MG/ML
INJECTION, SOLUTION INFILTRATION; PERINEURAL PRN
Status: DISCONTINUED | OUTPATIENT
Start: 2023-04-19 | End: 2023-04-19

## 2023-04-19 RX ORDER — ONDANSETRON 4 MG/1
4 TABLET, ORALLY DISINTEGRATING ORAL EVERY 30 MIN PRN
Status: DISCONTINUED | OUTPATIENT
Start: 2023-04-19 | End: 2023-04-19 | Stop reason: HOSPADM

## 2023-04-19 RX ORDER — OXYCODONE HYDROCHLORIDE 5 MG/1
5 TABLET ORAL EVERY 4 HOURS PRN
Status: DISCONTINUED | OUTPATIENT
Start: 2023-04-19 | End: 2023-04-21

## 2023-04-19 RX ORDER — HYDROMORPHONE HCL IN WATER/PF 6 MG/30 ML
0.4 PATIENT CONTROLLED ANALGESIA SYRINGE INTRAVENOUS EVERY 5 MIN PRN
Status: DISCONTINUED | OUTPATIENT
Start: 2023-04-19 | End: 2023-04-19 | Stop reason: HOSPADM

## 2023-04-19 RX ORDER — MAGNESIUM HYDROXIDE 1200 MG/15ML
LIQUID ORAL PRN
Status: DISCONTINUED | OUTPATIENT
Start: 2023-04-19 | End: 2023-04-19 | Stop reason: HOSPADM

## 2023-04-19 RX ORDER — ONDANSETRON 2 MG/ML
INJECTION INTRAMUSCULAR; INTRAVENOUS PRN
Status: DISCONTINUED | OUTPATIENT
Start: 2023-04-19 | End: 2023-04-19

## 2023-04-19 RX ORDER — DEXMEDETOMIDINE HYDROCHLORIDE 4 UG/ML
INJECTION, SOLUTION INTRAVENOUS PRN
Status: DISCONTINUED | OUTPATIENT
Start: 2023-04-19 | End: 2023-04-19

## 2023-04-19 RX ORDER — DEXAMETHASONE SODIUM PHOSPHATE 4 MG/ML
INJECTION, SOLUTION INTRA-ARTICULAR; INTRALESIONAL; INTRAMUSCULAR; INTRAVENOUS; SOFT TISSUE PRN
Status: DISCONTINUED | OUTPATIENT
Start: 2023-04-19 | End: 2023-04-19

## 2023-04-19 RX ORDER — CEFAZOLIN SODIUM/WATER 2 G/20 ML
2 SYRINGE (ML) INTRAVENOUS
Status: COMPLETED | OUTPATIENT
Start: 2023-04-19 | End: 2023-04-19

## 2023-04-19 RX ORDER — BUPIVACAINE HYDROCHLORIDE AND EPINEPHRINE 5; 5 MG/ML; UG/ML
INJECTION, SOLUTION EPIDURAL; INTRACAUDAL; PERINEURAL PRN
Status: DISCONTINUED | OUTPATIENT
Start: 2023-04-19 | End: 2023-04-19 | Stop reason: HOSPADM

## 2023-04-19 RX ADMIN — PHENYLEPHRINE HYDROCHLORIDE 0.5 MCG/KG/MIN: 10 INJECTION INTRAVENOUS at 09:54

## 2023-04-19 RX ADMIN — PROPOFOL 50 MG: 10 INJECTION, EMULSION INTRAVENOUS at 10:17

## 2023-04-19 RX ADMIN — SODIUM CHLORIDE AND POTASSIUM CHLORIDE: 9; 1.49 INJECTION, SOLUTION INTRAVENOUS at 18:29

## 2023-04-19 RX ADMIN — SUGAMMADEX 200 MG: 100 INJECTION, SOLUTION INTRAVENOUS at 11:57

## 2023-04-19 RX ADMIN — PIPERACILLIN AND TAZOBACTAM 3.38 G: 3; .375 INJECTION, POWDER, FOR SOLUTION INTRAVENOUS at 18:14

## 2023-04-19 RX ADMIN — MIDAZOLAM 2 MG: 1 INJECTION INTRAMUSCULAR; INTRAVENOUS at 09:21

## 2023-04-19 RX ADMIN — SODIUM CHLORIDE, POTASSIUM CHLORIDE, SODIUM LACTATE AND CALCIUM CHLORIDE: 600; 310; 30; 20 INJECTION, SOLUTION INTRAVENOUS at 09:03

## 2023-04-19 RX ADMIN — ROCURONIUM BROMIDE 10 MG: 50 INJECTION, SOLUTION INTRAVENOUS at 10:13

## 2023-04-19 RX ADMIN — PIPERACILLIN AND TAZOBACTAM 3.38 G: 3; .375 INJECTION, POWDER, FOR SOLUTION INTRAVENOUS at 06:04

## 2023-04-19 RX ADMIN — ONDANSETRON 4 MG: 2 INJECTION INTRAMUSCULAR; INTRAVENOUS at 11:49

## 2023-04-19 RX ADMIN — PROPOFOL 200 MG: 10 INJECTION, EMULSION INTRAVENOUS at 09:27

## 2023-04-19 RX ADMIN — OXYCODONE HYDROCHLORIDE 5 MG: 5 TABLET ORAL at 19:28

## 2023-04-19 RX ADMIN — PHENYLEPHRINE HYDROCHLORIDE 100 MCG: 10 INJECTION INTRAVENOUS at 09:55

## 2023-04-19 RX ADMIN — HYDROMORPHONE HYDROCHLORIDE 0.2 MG: 0.2 INJECTION, SOLUTION INTRAMUSCULAR; INTRAVENOUS; SUBCUTANEOUS at 18:08

## 2023-04-19 RX ADMIN — ROCURONIUM BROMIDE 20 MG: 50 INJECTION, SOLUTION INTRAVENOUS at 09:50

## 2023-04-19 RX ADMIN — SODIUM CHLORIDE, POTASSIUM CHLORIDE, SODIUM LACTATE AND CALCIUM CHLORIDE: 600; 310; 30; 20 INJECTION, SOLUTION INTRAVENOUS at 11:17

## 2023-04-19 RX ADMIN — FENTANYL CITRATE 100 MCG: 50 INJECTION, SOLUTION INTRAMUSCULAR; INTRAVENOUS at 09:27

## 2023-04-19 RX ADMIN — Medication 2 G: at 09:30

## 2023-04-19 RX ADMIN — ROCURONIUM BROMIDE 10 MG: 50 INJECTION, SOLUTION INTRAVENOUS at 10:47

## 2023-04-19 RX ADMIN — ROCURONIUM BROMIDE 50 MG: 50 INJECTION, SOLUTION INTRAVENOUS at 09:27

## 2023-04-19 RX ADMIN — DEXMEDETOMIDINE HYDROCHLORIDE 12 MCG: 200 INJECTION INTRAVENOUS at 10:03

## 2023-04-19 RX ADMIN — FENTANYL CITRATE 100 MCG: 50 INJECTION, SOLUTION INTRAMUSCULAR; INTRAVENOUS at 10:11

## 2023-04-19 RX ADMIN — DEXMEDETOMIDINE HYDROCHLORIDE 8 MCG: 200 INJECTION INTRAVENOUS at 10:05

## 2023-04-19 RX ADMIN — LIDOCAINE HYDROCHLORIDE 100 MG: 20 INJECTION, SOLUTION INFILTRATION; PERINEURAL at 09:27

## 2023-04-19 RX ADMIN — HYDROMORPHONE HYDROCHLORIDE 0.5 MG: 1 INJECTION, SOLUTION INTRAMUSCULAR; INTRAVENOUS; SUBCUTANEOUS at 11:50

## 2023-04-19 RX ADMIN — ACETAMINOPHEN 975 MG: 325 TABLET ORAL at 08:54

## 2023-04-19 RX ADMIN — DEXAMETHASONE SODIUM PHOSPHATE 4 MG: 4 INJECTION, SOLUTION INTRA-ARTICULAR; INTRALESIONAL; INTRAMUSCULAR; INTRAVENOUS; SOFT TISSUE at 09:36

## 2023-04-19 RX ADMIN — PIPERACILLIN AND TAZOBACTAM 3.38 G: 3; .375 INJECTION, POWDER, FOR SOLUTION INTRAVENOUS at 00:55

## 2023-04-19 RX ADMIN — HYDROMORPHONE HYDROCHLORIDE 0.5 MG: 1 INJECTION, SOLUTION INTRAMUSCULAR; INTRAVENOUS; SUBCUTANEOUS at 09:50

## 2023-04-19 RX ADMIN — SODIUM CHLORIDE AND POTASSIUM CHLORIDE: 9; 1.49 INJECTION, SOLUTION INTRAVENOUS at 00:18

## 2023-04-19 ASSESSMENT — ACTIVITIES OF DAILY LIVING (ADL)
ADLS_ACUITY_SCORE: 35

## 2023-04-19 ASSESSMENT — LIFESTYLE VARIABLES: TOBACCO_USE: 0

## 2023-04-19 NOTE — DISCHARGE INSTRUCTIONS
[facility] - SURGICAL CONSULTANTS  Discharge Instructions: Post-Operative Laparoscopic Cholecystectomy    ACTIVITY  Expect to feel tired after your surgery.  This will gradually resolve.    Take frequent, short walks and increase your activity gradually.    Avoid strenuous physical activity or heavy lifting greater than 15-20 lbs. for 2-3 weeks.  You may climb stairs.  You may drive without restrictions when you are not using any prescription pain medication and feel comfortable in a car.  You may return to work/school when you are comfortable without any prescription pain medication.    WOUND CARE  You may remove your outer dressing or Band-Aids and shower 48 hours after the surgery.  Pat your incisions dry and leave them open to air.  Re-apply dressing (Band-Aids or gauze/tape) as needed for comfort or drainage.  You may have steri-strips (looks like white tape) on your incision.  You may peel off the steri-strips 2 weeks after your surgery if they have not peeled off on their own.   Do not soak your incisions in a tub or pool for 2 weeks.   Do not apply any lotions, creams, or ointments to your incisions.  A ridge under your incisions is normal and will gradually resolve.    DIET  Start with liquids, then gradually resume your regular diet as tolerated.  Avoid heavy, spicy, and greasy meals for 2-3 days.  Drink plenty of fluids to stay hydrated.  It is not uncommon to experience some loose stools or diarrhea after surgery.  This is your body's way of adapting to the bile which will slowly drain into your intestine.  A low fat diet may help with this.  This should improve over 1-2 months.    PAIN  Expect some tenderness and discomfort at the incision sites.  Use the prescribed pain medication at your discretion.  Expect gradual resolution of your pain over several days.  You may take ibuprofen with food (unless you have been told not to) or acetaminophen/Tylenol instead of or in addition to your prescribed  pain medication.  If you are taking Norco or Percocet, do not take any additional acetaminophen/Tylenol.  Do not drink alcohol or drive while you are taking pain medications.  You may apply ice to your incisions in 20 minute intervals as needed for the next 48 hours.  After that time, consider switching to heat if you prefer.    EXPECTATIONS  Pain medications can cause constipation.  Limit use when possible.  Take over the counter stool softener/stimulant, such as Colace or Senna, 1-2 times a day with plenty of water.  You may take a mild over the counter laxative, such as Miralax or a suppository, as needed.  You may discontinue these medications once you are having regular bowel movements and/or are no longer taking your narcotic pain medication.    You may have shoulder or upper back discomfort due to the gas used in surgery.  This is temporary and should resolve in 48-72 hours.  Short, frequent walks may help with this.  If you are unable to urinate, or feel as though you are not emptying your bladder adequately, we recommend you call our office and/or seek care at an ER or Urgent Care facility if after hours.    FOLLOW UP  Our office will contact you in approximately 2 weeks to check on your progress and answer any questions you may have.  If you are doing well, you will not need to return for a follow up appointment.  If any concerns are identified over the phone, we will help you make an appointment to see a provider.   If you have not received a phone call, have any questions or concerns, or would like to be seen, please call us at [phone] and ask to speak with our nurse.  We are located at [address] [place] [address].    CALL OUR OFFICE -[phone] IF YOU HAVE:   Chills or fever above 101 F.  Increased redness, warmth, or drainage at your incisions.  Significant bleeding.  Pain not relieved by your pain medication or rest.  Increasing pain after the first 48 hours.  Any  other concerns or questions.

## 2023-04-19 NOTE — PROGRESS NOTES
Paged hospitalist regarding patient not having a active diet order upon return to until post lap lake. Instructed to ask gen surg. Left voicemail @ [phone] requesting new order. Updated hospitalist regarding barrier to contact gen surg.

## 2023-04-19 NOTE — PROGRESS NOTES
Pt A/Ox4, VSS on RA, independent in room, NPO since 0000 for piero bethea today, IVF & intermittent IV Abx, reported BMx1.

## 2023-04-19 NOTE — ANESTHESIA PROCEDURE NOTES
Airway       Patient location during procedure: OR       Procedure Start/Stop Times: 4/19/2023 9:29 AM  Staff -        CRNA: Wali Avila APRN CRNA       Performed By: CRNA  Consent for Airway        Urgency: elective  Indications and Patient Condition       Indications for airway management: gerson-procedural       Induction type:intravenous       Mask difficulty assessment: 1 - vent by mask    Final Airway Details       Final airway type: endotracheal airway       Successful airway: ETT - single  Endotracheal Airway Details        ETT size (mm): 8.0       Cuffed: yes       Successful intubation technique: video laryngoscopy       VL Blade Size: Glidescope 4       Grade View of Cords: 1       Adjucts: stylet       Position: Right       Measured from: gums/teeth       Secured at (cm): 24       Bite block used: None    Post intubation assessment        Placement verified by: capnometry, equal breath sounds and chest rise        Number of attempts at approach: 1       Number of other approaches attempted: 0       Secured with: pink tape       Ease of procedure: easy       Dentition: Intact and Unchanged    Medication(s) Administered   Medication Administration Time: 4/19/2023 9:29 AM

## 2023-04-19 NOTE — PROGRESS NOTES
A&O x4, deaf, needs . Has not been OOB post lap lake. VSS on RA. Denies abdominal pain, nausea/vomitting, diarrhea. CAROL drain intact to bulb suction. Voided x1 for 200mL. Full liquid diet.

## 2023-04-19 NOTE — OP NOTE
General Surgery Operative Note    PREOPERATIVE DIAGNOSIS:  Acute cholecystitis     POSTOPERATIVE DIAGNOSIS:  Same     PROCEDURE: LAPAROSCOPIC CHOLECYSTECTOMY     ANESTHESIA:  General    SURGEON:  Carrillo Guevara MD    ASSISTANT:  Kwesi Blankenship PA-C assisted in the above procedure. They provided assistance with pre-operative positioning, prepping, and draping of the patient. The assistant provided vital operative assistance with retraction using instruments thus providing the necessary exposure and visualization for the case, manipulation of tissues to achieve hemostasis, suction for visualization and assisted in closure of the wound. Post-operatively they assisted in transfer of the patient off the operative table and transition to the PACU physicians.    INDICATIONS:  Cristian Antonio is a 62 year old male who presented with complaints of abdominal pain.  Work-up was completed including physical exam, work-up, and labs and patient's presentation was most consistent with acute cholecystitis.  The decision was made for the patient to proceed to the operating room for laparoscopic removal of the gallbladder. The risks and benefits of the procedure were discussed with the patient, and consent for the procedure was obtained.     PROCEDURE: The patient was brought to the preoperative area and preoperative antibiotics were administered. The patient was brought back to the operating room and placed in the supine position on the operating table. Anesthesia was induced and the patient was intubated. The patient was secured to the operating table and their pressure points were padded. The patient's abdomen was prepped and draped in the sterile fashion. A time out was completed that confirmed patient, procedure, site of surgery, and any special equipment needed for the stated procedure.    Following infiltration of local anesthetic, an 11 blade scalpel was used to make a small supraumbilical incision.  Blunt dissection with S  retractors was carried down to fascia.  The fascia was then grasped with Kocher clamps and Amado scissors were used to open the fascia at midline.  Entrance into the abdomen was obtained and no surrounding adhesions or bowels was visualized or palpated.  A 12 mm Vargas balloon port was placed into the site.  The patient's abdomen was then fully insufflated with CO2.  The scope was then placed within the abdomen.  Initial inspection revealed no evidence of injury at the port entry site.  Under direct visualization 3 additional ports were placed, one 5 mm port in the subxiphoid and two 5 mm right lateral ports.    The right upper quadrant was examined and there was significant omental adhesions to the gallbladder.  Carefully the omentum was bluntly dissected from the dome of the gallbladder allowing better visualization.  The gallbladder was identified, grasped, and retracted cephalad over the dome of the liver. The gallbladder appeared severely inflamed and distended with a thick rind. The gallbladder was drained using a laparoscopic needle. The infundibulum of the gallbladder was grasped and retracted laterally. A combination of careful dissection utilizing the suction/, [place] dissector and hook electrocautery was then carried out to carefully dissect out the cystic duct and cystic artery.  The transition of the infundibulum to the cystic duct was visualized but due to rind, the duct itself was unable to be isolated. The posterior side of the gallbladder was dissection from the liver bed circumferentially and isolated.  The infundibulum of the gallbladder was inadvertently entered due to the gangrenous nature of it. We were able to visualize a single duct opening from inside the gallbladder. This confirmed that both structures could be clearly seen to be coursing directly into the gallbladder and the critical view had been obtained. Due to the thickness of the rind of the infundibulum, the duct was  elected to be ligated and transected with a stapler.  The cystic artery was doubly clipped proximally, singly clipped distally, and divided. The subxiphoid port was upsized to a 12mm port. The [place] stapler was brought into the field and with a 45mm blue load, the gallbladder was stapled and transected at the distal infundibulum just above the cystic duct opening. Prior to stapling, care was taken to ensure the tips were clear of any tissue or structures.  The cystic stump was examined and confirmed to be intact without any bleeding or bilious drainage. The hook electrocautery was then used to carefully dissect the gallbladder free from its attachments to the liver bed. When the gallbladder had been completely dissected free, it was placed in an Endo Catch bag and removed through the left upper quadrant port. The port was reinserted and the surgical site inspected. Hemostasis of the liver bed was obtained using the electrocautery and fibrin seal. The patient's right upper quadrant was then irrigated and suctioned with normal saline solution. A 19Fr round channel drain was placed into the right upper quadrant through the right lateral port. The subxiphoid port was closed with an 0 Vicryl stitch and the Cosmo-Thony fascial closure device. The patient's supraumbilical port was then removed and there was no evidence of bleeding at the port exit site.  The fascia was reapproximated using a figure-of-eight UR6 0 Vicryl stitch. The patient's 5 mm ports were then removed under direct visualization and there was no evidence of bleeding at the port exit sites. The patient's abdomen was then allowed to desufflate completely.  The port sites were then inspected and hemostasis was obtained using the electrocautery. The port sites were reapproximated using 4-0 Monocryl subcuticular stitches. The incisions were washed and dried and sterile dressings were applied. The lap, needle, and instrument counts were correct at the end  of the procedure. The patient tolerated the procedure well and was extubated and taken to the recovery area in stable condition.     ESTIMATED BLOOD LOSS:  50cc    INTRAOPERATIVE FINDINGS:  Severely inflamed and distended gallbladder.    SPECIMENS: Gallbladder and contents     DRAINS: 19Fr round channel drain in RUQ    CONDITION: Patient was brought to the PACU in stable condition.       Carrillo Guevara MD

## 2023-04-19 NOTE — ANESTHESIA POSTPROCEDURE EVALUATION
Patient: Cristian Antonio    Procedure: Procedure(s):  LAPAROSCOPIC CHOLECYSTECTOMY       Anesthesia Type:  General    Note:  Disposition: Outpatient   Postop Pain Control: Uneventful            Sign Out: Well controlled pain   PONV: No   Neuro/Psych: Uneventful            Sign Out: Acceptable/Baseline neuro status   Airway/Respiratory: Uneventful            Sign Out: Acceptable/Baseline resp. status   CV/Hemodynamics: Uneventful            Sign Out: Acceptable CV status; No obvious hypovolemia; No obvious fluid overload   Other NRE: NONE   DID A NON-ROUTINE EVENT OCCUR? No           Last vitals:  Vitals Value Taken Time   /105 04/19/23 1315   Temp 36.8  C (98.3  F) 04/19/23 1230   Pulse 75 04/19/23 1327   Resp 12 04/19/23 1327   SpO2 95 % 04/19/23 1327   Vitals shown include unvalidated device data.    Electronically Signed By: Toi Hernandez MD  April 19, 2023  1:29 PM

## 2023-04-19 NOTE — ANESTHESIA PREPROCEDURE EVALUATION
"Anesthesia Pre-Procedure Evaluation    Patient: Cristian Antonio   MRN: [id number] : 1960        Procedure : Procedure(s):  CHOLECYSTECTOMY, LAPAROSCOPIC, WITH CHOLANGIOGRAM          Past Medical History:   Diagnosis Date     CARDIOVASCULAR SCREENING; LDL GOAL LESS THAN 160 2/10/2010     Deaf 2014      History reviewed. No pertinent surgical history.   Allergies   Allergen Reactions     Banana Nausea     Ibuprofen Other (See Comments)     Uncontrollable shaking for approx. 20 minutes - almost \"seizure-like\"      Social History     Tobacco Use     Smoking status: Never     Smokeless tobacco: Never   Vaping Use     Vaping status: Not on file   Substance Use Topics     Alcohol use: No      Wt Readings from Last 1 Encounters:   23 109.5 kg (241 lb 6.4 oz)        Anesthesia Evaluation   Pt has had prior anesthetic.     No history of anesthetic complications       ROS/MED HX  ENT/Pulmonary: Comment: Pt is deaf   (-) tobacco use   Neurologic:       Cardiovascular:     (+) hypertension-----    METS/Exercise Tolerance:     Hematologic:       Musculoskeletal:       GI/Hepatic:     (+) cholecystitis/cholelithiasis,     Renal/Genitourinary:     (+) renal disease, type: CRI,     Endo:       Psychiatric/Substance Use:       Infectious Disease:       Malignancy:       Other:            Physical Exam    Airway        Mallampati: II    Neck ROM: full     Respiratory Devices and Support         Dental       (+) Minor Abnormalities - some fillings, tiny chips      Cardiovascular   cardiovascular exam normal          Pulmonary   pulmonary exam normal                OUTSIDE LABS:  CBC:   Lab Results   Component Value Date    WBC 14.4 (H) 2023    WBC 17.7 (H) 2023    HGB 14.7 2023    HGB 14.9 2023    HCT 43.1 2023    HCT 42.5 2023     (L) 2023    PLT 84 (L) 2023     BMP:   Lab Results   Component Value Date     2023     2023    POTASSIUM 3.9 " 04/19/2023    POTASSIUM 3.9 04/18/2023    CHLORIDE 106 04/19/2023    CHLORIDE 107 04/18/2023    CO2 23 04/19/2023    CO2 21 (L) 04/18/2023    BUN 23.1 (H) 04/19/2023    BUN 39.0 (H) 04/18/2023    CR 1.46 (H) 04/19/2023    CR 1.82 (H) 04/18/2023     (H) 04/19/2023     (H) 04/18/2023     COAGS:   Lab Results   Component Value Date    PTT 29 04/09/2008    INR 1.00 04/09/2008     POC: No results found for: BGM, HCG, HCGS  HEPATIC:   Lab Results   Component Value Date    ALBUMIN 2.8 (L) 04/19/2023    PROTTOTAL 6.3 (L) 04/19/2023    ALT 38 04/19/2023    AST 26 04/19/2023    ALKPHOS 198 (H) 04/19/2023    BILITOTAL 1.4 (H) 04/19/2023     OTHER:   Lab Results   Component Value Date    EMILIA 8.5 (L) 04/19/2023    LIPASE 13 04/16/2023    TSH 1.51 04/10/2008    T4 1.00 04/10/2008       Anesthesia Plan    ASA Status:  2   NPO Status:  NPO Appropriate    Anesthesia Type: General.     - Airway: ETT   Induction: Intravenous.   Maintenance: Balanced.   Techniques and Equipment:     - Airway: Video-Laryngoscope         Consents    Anesthesia Plan(s) and associated risks, benefits, and realistic alternatives discussed. Questions answered and patient/representative(s) expressed understanding.    - Discussed:     - Discussed with:  Patient         Postoperative Care    Pain management: IV analgesics.   PONV prophylaxis: Ondansetron (or other 5HT-3)     Comments:                Toi Hernandez MD

## 2023-04-20 LAB
ALBUMIN SERPL BCG-MCNC: 2.6 G/DL (ref 3.5–5.2)
ALP SERPL-CCNC: 139 U/L (ref 40–129)
ALT SERPL W P-5'-P-CCNC: 42 U/L (ref 10–50)
ANION GAP SERPL CALCULATED.3IONS-SCNC: 9 MMOL/L (ref 7–15)
AST SERPL W P-5'-P-CCNC: 29 U/L (ref 10–50)
BILIRUB DIRECT SERPL-MCNC: 0.34 MG/DL (ref 0–0.3)
BILIRUB SERPL-MCNC: 0.9 MG/DL
BUN SERPL-MCNC: 19.1 MG/DL (ref 8–23)
CALCIUM SERPL-MCNC: 7.9 MG/DL (ref 8.8–10.2)
CHLORIDE SERPL-SCNC: 106 MMOL/L (ref 98–107)
CREAT SERPL-MCNC: 1.14 MG/DL (ref 0.67–1.17)
DEPRECATED HCO3 PLAS-SCNC: 22 MMOL/L (ref 22–29)
ERYTHROCYTE [DISTWIDTH] IN BLOOD BY AUTOMATED COUNT: 13.8 % (ref 10–15)
GFR SERPL CREATININE-BSD FRML MDRD: 72 ML/MIN/1.73M2
GLUCOSE BLDC GLUCOMTR-MCNC: 123 MG/DL (ref 70–99)
GLUCOSE SERPL-MCNC: 126 MG/DL (ref 70–99)
HCT VFR BLD AUTO: 43.3 % (ref 40–53)
HGB BLD-MCNC: 14.4 G/DL (ref 13.3–17.7)
MCH RBC QN AUTO: 28.7 PG (ref 26.5–33)
MCHC RBC AUTO-ENTMCNC: 33.3 G/DL (ref 31.5–36.5)
MCV RBC AUTO: 86 FL (ref 78–100)
PLATELET # BLD AUTO: 208 10E3/UL (ref 150–450)
POTASSIUM SERPL-SCNC: 4.3 MMOL/L (ref 3.4–5.3)
PROT SERPL-MCNC: 5.7 G/DL (ref 6.4–8.3)
RBC # BLD AUTO: 5.01 10E6/UL (ref 4.4–5.9)
SODIUM SERPL-SCNC: 137 MMOL/L (ref 136–145)
WBC # BLD AUTO: 13.5 10E3/UL (ref 4–11)

## 2023-04-20 PROCEDURE — 85027 COMPLETE CBC AUTOMATED: CPT | Performed by: HOSPITALIST

## 2023-04-20 PROCEDURE — 80053 COMPREHEN METABOLIC PANEL: CPT | Performed by: HOSPITALIST

## 2023-04-20 PROCEDURE — 250N000013 HC RX MED GY IP 250 OP 250 PS 637: Performed by: PHYSICIAN ASSISTANT

## 2023-04-20 PROCEDURE — 82248 BILIRUBIN DIRECT: CPT | Performed by: STUDENT IN AN ORGANIZED HEALTH CARE EDUCATION/TRAINING PROGRAM

## 2023-04-20 PROCEDURE — 250N000011 HC RX IP 250 OP 636: Performed by: PHYSICIAN ASSISTANT

## 2023-04-20 PROCEDURE — 250N000013 HC RX MED GY IP 250 OP 250 PS 637: Performed by: HOSPITALIST

## 2023-04-20 PROCEDURE — 120N000001 HC R&B MED SURG/OB

## 2023-04-20 PROCEDURE — 250N000011 HC RX IP 250 OP 636: Performed by: HOSPITALIST

## 2023-04-20 PROCEDURE — 36415 COLL VENOUS BLD VENIPUNCTURE: CPT | Performed by: HOSPITALIST

## 2023-04-20 PROCEDURE — 99232 SBSQ HOSP IP/OBS MODERATE 35: CPT | Performed by: HOSPITALIST

## 2023-04-20 RX ORDER — AMLODIPINE BESYLATE 5 MG/1
5 TABLET ORAL DAILY
Status: DISCONTINUED | OUTPATIENT
Start: 2023-04-20 | End: 2023-04-22 | Stop reason: HOSPADM

## 2023-04-20 RX ORDER — AMOXICILLIN 250 MG
1 CAPSULE ORAL 2 TIMES DAILY PRN
Qty: 12 TABLET | Refills: 0 | Status: SHIPPED | OUTPATIENT
Start: 2023-04-20 | End: 2023-07-17

## 2023-04-20 RX ORDER — OXYCODONE HYDROCHLORIDE 5 MG/1
5 TABLET ORAL EVERY 4 HOURS PRN
Qty: 12 TABLET | Refills: 0 | Status: SHIPPED | OUTPATIENT
Start: 2023-04-20 | End: 2023-07-17

## 2023-04-20 RX ORDER — HYDRALAZINE HYDROCHLORIDE 20 MG/ML
10 INJECTION INTRAMUSCULAR; INTRAVENOUS EVERY 4 HOURS PRN
Status: DISCONTINUED | OUTPATIENT
Start: 2023-04-20 | End: 2023-04-22 | Stop reason: HOSPADM

## 2023-04-20 RX ADMIN — ACETAMINOPHEN 650 MG: 325 TABLET ORAL at 00:24

## 2023-04-20 RX ADMIN — ACETAMINOPHEN 650 MG: 325 TABLET ORAL at 18:09

## 2023-04-20 RX ADMIN — HYDROMORPHONE HYDROCHLORIDE 0.2 MG: 0.2 INJECTION, SOLUTION INTRAMUSCULAR; INTRAVENOUS; SUBCUTANEOUS at 05:14

## 2023-04-20 RX ADMIN — OXYCODONE HYDROCHLORIDE 5 MG: 5 TABLET ORAL at 00:24

## 2023-04-20 RX ADMIN — SODIUM CHLORIDE AND POTASSIUM CHLORIDE: 9; 1.49 INJECTION, SOLUTION INTRAVENOUS at 05:17

## 2023-04-20 RX ADMIN — PIPERACILLIN AND TAZOBACTAM 3.38 G: 3; .375 INJECTION, POWDER, FOR SOLUTION INTRAVENOUS at 18:09

## 2023-04-20 RX ADMIN — PIPERACILLIN AND TAZOBACTAM 3.38 G: 3; .375 INJECTION, POWDER, FOR SOLUTION INTRAVENOUS at 23:41

## 2023-04-20 RX ADMIN — PIPERACILLIN AND TAZOBACTAM 3.38 G: 3; .375 INJECTION, POWDER, FOR SOLUTION INTRAVENOUS at 05:11

## 2023-04-20 RX ADMIN — PIPERACILLIN AND TAZOBACTAM 3.38 G: 3; .375 INJECTION, POWDER, FOR SOLUTION INTRAVENOUS at 13:43

## 2023-04-20 RX ADMIN — ACETAMINOPHEN 650 MG: 325 TABLET ORAL at 05:16

## 2023-04-20 RX ADMIN — OXYCODONE HYDROCHLORIDE 5 MG: 5 TABLET ORAL at 14:30

## 2023-04-20 RX ADMIN — PIPERACILLIN AND TAZOBACTAM 3.38 G: 3; .375 INJECTION, POWDER, FOR SOLUTION INTRAVENOUS at 00:19

## 2023-04-20 RX ADMIN — AMLODIPINE BESYLATE 5 MG: 5 TABLET ORAL at 08:23

## 2023-04-20 RX ADMIN — OXYCODONE HYDROCHLORIDE 5 MG: 5 TABLET ORAL at 09:00

## 2023-04-20 ASSESSMENT — ACTIVITIES OF DAILY LIVING (ADL)
ADLS_ACUITY_SCORE: 35

## 2023-04-20 NOTE — PROGRESS NOTES
[facility]    Hospitalist Progress Note      Assessment & Plan   Cristian Antonio is a 62 year old male admitted on 4/16/2023 with n/v, diarrhea    N/V/D  Abnormal LFTs  Leukocytosis, procalcitonin 34.  Acute Cholecystitis per CT 4/18/23; s/p lap cholX 4/19/23  Doesn't regularly follow with providers. Had [place] 4/12, since then developed abdominal pain, n/v,diarrhea. Diarrhea has improved but still some nv. Episode of chills. No fevers or bloody stools. Tolerated meal in ED,In ED tachy 120s, BPs stable. Creat 3.45, Na 131, WBC normal, bili 1.4., Alt 56. CXR clear.   - repeat LFTs in am now normal except alk ptase 150; lipase WNL  -White count in a.m. increased to 14.5, procalcitonin 33.93, UA without significant pyuria.  CXR negative.  Clinically appears consistent with foodborne gastroenteritis however procalcitonin 33.9 and elevated white count, will initiate empiric antibiotics, BC x2, follow temp profile and CBC.  Repeat LFTs in AM.  -4/18/2023 WBC further increased to 17.7; persistent evaded alkaline phosphatase, bilirubin.  CT abdomen pelvis obtained consistent with acute cholecystitis. Continue Zosyn, laparoscopic cholecystectomy per general surgery without reported complications.  -Continues on Zosyn postop per General surgery, white count remains elevated.  Recheck LFTs and CBC/WBC in a.m.  -Postop diet per General surgery, check CBC in a.m.      SANDHYA  Hyponatremia  Anion gap acidosis  Creatinine baseline previous to have been 1-1.1. On presentation at 3.45. NA sl low at 131 on presentation. Hopeful SANDHYA 2/2 dehydration, possible ATN as well. Lower suspicion for chronic intrinsic disease but possible w/ no recent creatinine.     UA fairly bland as above.    -4/18/2023 CR slow improvement, continue NS IVF, sodium now normal.  Recheck BMP in a.m.  -     Thrombocytopenia  Platelets at 85 on presentation, baseline 2021 233.   -Slow improvement, platelets under 1018.  Recheck in a.m.  postop.    DVT Prophylaxis: Ambulate every shift  Code Status: Full Code     Expected Discharge Date: 1-2 days pending postop course.  Chandrakant Du MD  [employer] (7am - 6pm, M-F)    I spent 35 minutes total time in management of care today 4/19/2023 reviewing labs, medications, interdisciplinary notes; and completing documentation of encounter and orders with Care Management including Coordinating Care and plan with Nursing and Specialists, Gen Surgery re; planned lap choleX  and postop  management and surveillance      Interval History   Plan lap [place] today.  Nursing reports no further nausea, emesis, no abdominal pain.    SH: No tobacco.  Lives with wife who uses Sign Language    ROS: Complete ROS negative except as above.     -Data reviewed today: I reviewed all new labs and imaging results over the last 24 hours.    Physical Exam   Temp: 97.3  F (36.3  C) Temp src: Oral BP: (!) 163/95 Pulse: 67   Resp: 20 SpO2: 95 % O2 Device: None (Room air) Oxygen Delivery: 2 LPM  Vitals:    04/16/23 2301   Weight: 109.5 kg (241 lb 6.4 oz)     Vital Signs with Ranges  Temp:  [97.3  F (36.3  C)-100.6  F (38.1  C)] 97.3  F (36.3  C)  Pulse:  [67-89] 67  Resp:  [11-20] 20  BP: (146-175)/() 163/95  SpO2:  [92 %-97 %] 95 %  I/O last 3 completed shifts:  In: 1900 [P.O.:600; I.V.:1300]  Out: 250 [Urine:200; Blood:50]    General/Constitutional:     NAD, alert, calm, cooperative . Uses sign language to communicate.  Chest/Respiratory:  Respirations nonlabored room air   Cardiovascular:  no murmur appreciated.  LE edema none  Gastrointestinal/Abdomen: Soft, nontender, no rebound, guarding or other peritoneal signs.    Neuro.  Gross motor tested, nonfocal,  Psych oriented, affect calm       Medications     0.9% sodium chloride + KCl 20 mEq/L 125 mL/hr at 04/19/23 1829       piperacillin-tazobactam  3.375 g Intravenous Q6H     sodium chloride (PF)  3 mL Intracatheter Q8H       Data   Recent Labs   Lab 04/19/23  1233  04/19/23  0641 04/18/23  0637 04/17/23  0643 04/16/23  1733   WBC  --  14.4* 17.7* 14.5* 7.2   HGB  --  14.7 14.9 15.4 17.7   MCV  --  84 83 82 81   PLT  --  118* 84* 71* 85*   NA  --  138 137 135* 131*   POTASSIUM  --  3.9 3.9 3.7 3.7   CHLORIDE  --  106 107 103 93*   CO2  --  23 21* 20* 20*   BUN  --  23.1* 39.0* 68.3* 81.4*   CR  --  1.46* 1.82* 2.64* 3.45*   ANIONGAP  --  9 9 12 18*   EMILIA  --  8.5* 8.6* 8.5* 9.0   * 119* 125* 147* 107*   ALBUMIN  --  2.8* 3.0* 2.9* 3.4*   PROTTOTAL  --  6.3* 6.4 6.2* 7.3   BILITOTAL  --  1.4* 1.5* 1.2 1.4*   ALKPHOS  --  198* 218* 150* 142*   ALT  --  38 40 50 56*   AST  --  26 23 35 49   LIPASE  --   --   --   --  13

## 2023-04-20 NOTE — PROGRESS NOTES
Patient vital signs are at baseline:Yes, except HTN  Patient able to ambulate as they were prior to admission or with assist devices provided by therapies during their stay: Not out of bed this shift.  Patient MUST void prior to discharge: Yes  Patient able to tolerate oral intake:  Yes  Pain has adequate pain control using Oral analgesics:  Yes  Does patient have an identified : Yes  Has goal D/C date and time been discussed with patient:  TBD   Pt is alert and oriented except deaf. VSS except HTN on RA.  Pain was managed with oxycodone, tylenol, dilaudi

## 2023-04-20 NOTE — PROGRESS NOTES
[facility]    Hospitalist Progress Note      Assessment & Plan   Cristian Antonio is a 62 year old male admitted on 4/16/2023 with n/v, diarrhea    N/V/D  Abnormal LFTs  Leukocytosis, procalcitonin 34.  Acute Cholecystitis per CT 4/18/23; s/p lap cholX 4/19/23  Doesn't regularly follow with providers. Had [place] 4/12, since then developed abdominal pain, n/v,diarrhea. Diarrhea has improved but still some nv. Episode of chills. No fevers or bloody stools. Tolerated meal in ED,In ED tachy 120s, BPs stable. Creat 3.45, Na 131, WBC normal, bili 1.4., Alt 56. CXR clear.   - repeat LFTs in am now normal except alk ptase 150; lipase WNL  -White count in a.m. increased to 14.5, procalcitonin 33.93, UA without significant pyuria.  CXR negative.  Clinically appears consistent with foodborne gastroenteritis however procalcitonin 33.9 and elevated white count, will initiate empiric antibiotics, BC x2, follow temp profile and CBC.  Repeat LFTs in AM.  -4/18/2023 WBC further increased to 17.7; persistent evaded alkaline phosphatase, bilirubin.  CT abdomen pelvis obtained consistent with acute cholecystitis. Continue Zosyn, laparoscopic cholecystectomy per general surgery without reported complications.  -Continues on Zosyn postop per General surgery, white count remains elevated.  -  -POD 1 tolerating full liquid diets and fluids, general surgery advance to mechanical soft.  WBC remains elevated to 13.5, continue Zosyn, recheck CBC in AM.  Afebrile, nursing notes no wound issues.  Drain in place, minimal nonpurulent pink fluids.  Surgery described necrotic gallbladder with 3 stones.  LFTs not increased.  Patient reports diffuse abdominal pain consistent with air from laparoscopy and abdominal wounds.  Reports some low back pain however attributes that to primarily being in bed, notes no history of low back pain, denies lumbar S/S, exam nonfocal.  CR now normal, patient taking fluids well, DC IVF.  DC  IV hydromorphone, transition to as needed p.o., monitor bowels.  Encourage ambulation, initially with assistance.      SANDHYA  Hyponatremia  Anion gap acidosis  Creatinine baseline previous to have been 1-1.1. On presentation at 3.45. NA sl low at 131 on presentation. Hopeful SANDHYA 2/2 dehydration, possible ATN as well. Lower suspicion for chronic intrinsic disease but possible w/ no recent creatinine.     UA fairly bland as above.    -CR now normal, patient taking p.o.'s, DC IVF.  BMP in a.m.  -     Thrombocytopenia  Platelets at 85 on presentation, baseline 2021 233.   -Platelets now normal.    DVT Prophylaxis: Ambulate every shift  Code Status: Full Code     Expected Discharge Date: 1 day pending tolerating p.o.'s, off IV antibiotics and pain medication  Chandrakant Du MD  Text Page (7am - 6pm, M-F)    I spent 35 minutes total time in management of care today 4/20/2023 reviewing labs, medications, interdisciplinary notes; and completing documentation of encounter and orders with Care Management including counseling/discussion with the Patient and Wife with Sign  regarding postop abdominal and low back pain, p.o. intake, elevated WBC, continue IV antibiotics, transition IV hydromorphone to as needed p.o. and Coordinating Care and plan with Nursing and Specialists, General Surgery regarding postop management and surveillance     Interval History   Plan lap lake today.  Nursing reports no further nausea, emesis, no abdominal pain.    SH: No tobacco.  Lives with wife who uses Sign Language    ROS: Complete ROS negative except as above.     -Data reviewed today: I reviewed all new labs and imaging results over the last 24 hours.    Physical Exam   Temp: 98.1  F (36.7  C) Temp src: Oral BP: (!) 152/98 Pulse: 68   Resp: 16 SpO2: 94 % O2 Device: None (Room air)    Vitals:    04/16/23 2301   Weight: 109.5 kg (241 lb 6.4 oz)     Vital Signs with Ranges  Temp:  [97.3  F (36.3  C)-98.6  F (37  C)] 98.1  F (36.7   C)  Pulse:  [67-70] 68  Resp:  [13-20] 16  BP: (146-163)/() 152/98  SpO2:  [92 %-96 %] 94 %  I/O last 3 completed shifts:  In: 1660 [P.O.:360; I.V.:1300]  Out: 1570 [Urine:1475; Drains:45; Blood:50]    General/Constitutional:    NAD, alert, calm, cooperative   Uses sign language to communicate.  Chest/Respiratory:  Respirations nonlabored room air   Cardiovascular:  no murmur appreciated.  LE edema none  Gastrointestinal/Abdomen: Firm, minimal tenderness over wound sites, no rebound, guarding or peritoneal signs.  Drain in place, minimal pink nonpurulent fluid   neuro.  Gross motor tested, nonfocal, motor strength LUE 5/5 bilaterally PCD on, negative Hom[place].  Psych oriented, affect calm.      Medications       amLODIPine  5 mg Oral Daily     piperacillin-tazobactam  3.375 g Intravenous Q6H     sodium chloride (PF)  3 mL Intracatheter Q8H       Data   Recent Labs   Lab 04/20/23  1322 04/20/23  0651 04/20/23  0627 04/19/23  2249 04/19/23  1233 04/19/23  0641 04/18/23  0637 04/17/23  0643 04/16/23  1733   WBC 13.5*  --   --  14.8*  --  14.4* 17.7*   < > 7.2   HGB 14.4  --   --  13.7  --  14.7 14.9   < > 17.7   MCV 86  --   --  87  --  84 83   < > 81     --   --  162  --  118* 84*   < > 85*   NA  --  137  --   --   --  138 137   < > 131*   POTASSIUM  --  4.3  --   --   --  3.9 3.9   < > 3.7   CHLORIDE  --  106  --   --   --  106 107   < > 93*   CO2  --  22  --   --   --  23 21*   < > 20*   BUN  --  19.1  --   --   --  23.1* 39.0*   < > 81.4*   CR  --  1.14  --   --   --  1.46* 1.82*   < > 3.45*   ANIONGAP  --  9  --   --   --  9 9   < > 18*   EMILIA  --  7.9*  --   --   --  8.5* 8.6*   < > 9.0   GLC  --  126* 123*  --  135* 119* 125*   < > 107*   ALBUMIN  --   --   --  2.7*  --  2.8* 3.0*   < > 3.4*   PROTTOTAL  --   --   --  5.9*  --  6.3* 6.4   < > 7.3   BILITOTAL  --   --   --  0.8  --  1.4* 1.5*   < > 1.4*   ALKPHOS  --   --   --  146*  --  198* 218*   < > 142*   ALT  --   --   --  43  --  38 40   < > 56*    AST  --   --   --  35  --  26 23   < > 49   LIPASE  --   --   --   --   --   --   --   --  13    < > = values in this interval not displayed.

## 2023-04-20 NOTE — PROGRESS NOTES
"Surgery    Abdominal pain, and some back pain  +bowel function, flatus  Tolerating diet  Not walking yet, legs feel heavy  Voiding in urinal  Denies CP, dyspnea    Gen:  Awake, Alert, NAD  BP (!) 152/98 (BP Location: Right arm)   Pulse 68   Temp 98.1  F (36.7  C) (Oral)   Resp 16   Ht 1.956 m (6' 5\")   Wt 109.5 kg (241 lb 6.4 oz)   SpO2 94%   BMI 28.63 kg/m    Resp - Non-labored  Abdomen - soft, appropriately tender, mildly distended. Incisions healing appropriately without erythema or drainage. Drain site ok. Serosang output 45 ml since surg  Extremities - no lower extremity edema or tenderness with palpation. scds in place    lft's normalizing  Wbc pending    A/P s/p laparoscopic cholecystectomy on 4/19/23.    - ADAT  - continue zosyn until wbc normal  - oxycodone for pain  - discontinue drain prior to discharge   - encourage incentive spirometer use  - ambulate  - dispo: home today vs tomorrow  - letter to his employer completed in epic  - instructed  - surgery team will follow up in about 2 weeks with phone call    Kwesi Blankenship PA-C  Office: [phone]  Pager: [phone]      "

## 2023-04-20 NOTE — PROGRESS NOTES
Pt was asleep @ arrival time. POD  0 of [place]. On 0.9%+KCL 20meql infusing @ 125ml/hr, rate changed to 75ml/hr. CAROL drain in place with small output. Capno. On RA. A/o x4. Deaf; communicate via writing on a paper. Rate pain 8/10, prn dilaudid and oxy given. Denies N/V. Intermittent abx infused.

## 2023-04-21 LAB
ALBUMIN SERPL BCG-MCNC: 2.6 G/DL (ref 3.5–5.2)
ALP SERPL-CCNC: 154 U/L (ref 40–129)
ALT SERPL W P-5'-P-CCNC: 46 U/L (ref 10–50)
ANION GAP SERPL CALCULATED.3IONS-SCNC: 9 MMOL/L (ref 7–15)
AST SERPL W P-5'-P-CCNC: 36 U/L (ref 10–50)
BILIRUB SERPL-MCNC: 0.8 MG/DL
BUN SERPL-MCNC: 18 MG/DL (ref 8–23)
CALCIUM SERPL-MCNC: 8.1 MG/DL (ref 8.8–10.2)
CHLORIDE SERPL-SCNC: 103 MMOL/L (ref 98–107)
CREAT SERPL-MCNC: 1.21 MG/DL (ref 0.67–1.17)
DEPRECATED HCO3 PLAS-SCNC: 25 MMOL/L (ref 22–29)
ERYTHROCYTE [DISTWIDTH] IN BLOOD BY AUTOMATED COUNT: 13.6 % (ref 10–15)
GFR SERPL CREATININE-BSD FRML MDRD: 67 ML/MIN/1.73M2
GLUCOSE SERPL-MCNC: 104 MG/DL (ref 70–99)
HCT VFR BLD AUTO: 38.1 % (ref 40–53)
HGB BLD-MCNC: 13.2 G/DL (ref 13.3–17.7)
MCH RBC QN AUTO: 29.7 PG (ref 26.5–33)
MCHC RBC AUTO-ENTMCNC: 34.6 G/DL (ref 31.5–36.5)
MCV RBC AUTO: 86 FL (ref 78–100)
PATH REPORT.COMMENTS IMP SPEC: NORMAL
PATH REPORT.COMMENTS IMP SPEC: NORMAL
PATH REPORT.FINAL DX SPEC: NORMAL
PATH REPORT.GROSS SPEC: NORMAL
PATH REPORT.MICROSCOPIC SPEC OTHER STN: NORMAL
PATH REPORT.RELEVANT HX SPEC: NORMAL
PHOTO IMAGE: NORMAL
PLATELET # BLD AUTO: 223 10E3/UL (ref 150–450)
POTASSIUM SERPL-SCNC: 4.1 MMOL/L (ref 3.4–5.3)
PROCALCITONIN SERPL IA-MCNC: 2.52 NG/ML
PROT SERPL-MCNC: 5.9 G/DL (ref 6.4–8.3)
RBC # BLD AUTO: 4.45 10E6/UL (ref 4.4–5.9)
SODIUM SERPL-SCNC: 137 MMOL/L (ref 136–145)
WBC # BLD AUTO: 13.9 10E3/UL (ref 4–11)

## 2023-04-21 PROCEDURE — 84145 PROCALCITONIN (PCT): CPT | Performed by: HOSPITALIST

## 2023-04-21 PROCEDURE — 250N000013 HC RX MED GY IP 250 OP 250 PS 637: Performed by: PHYSICIAN ASSISTANT

## 2023-04-21 PROCEDURE — 250N000013 HC RX MED GY IP 250 OP 250 PS 637: Performed by: STUDENT IN AN ORGANIZED HEALTH CARE EDUCATION/TRAINING PROGRAM

## 2023-04-21 PROCEDURE — 258N000003 HC RX IP 258 OP 636: Performed by: HOSPITALIST

## 2023-04-21 PROCEDURE — 250N000013 HC RX MED GY IP 250 OP 250 PS 637: Performed by: HOSPITALIST

## 2023-04-21 PROCEDURE — 85027 COMPLETE CBC AUTOMATED: CPT | Performed by: HOSPITALIST

## 2023-04-21 PROCEDURE — 36415 COLL VENOUS BLD VENIPUNCTURE: CPT | Performed by: STUDENT IN AN ORGANIZED HEALTH CARE EDUCATION/TRAINING PROGRAM

## 2023-04-21 PROCEDURE — 99232 SBSQ HOSP IP/OBS MODERATE 35: CPT | Performed by: HOSPITALIST

## 2023-04-21 PROCEDURE — 80053 COMPREHEN METABOLIC PANEL: CPT | Performed by: STUDENT IN AN ORGANIZED HEALTH CARE EDUCATION/TRAINING PROGRAM

## 2023-04-21 PROCEDURE — 250N000011 HC RX IP 250 OP 636: Performed by: PHYSICIAN ASSISTANT

## 2023-04-21 PROCEDURE — 120N000001 HC R&B MED SURG/OB

## 2023-04-21 RX ORDER — ACETAMINOPHEN 650 MG/1
650 SUPPOSITORY RECTAL EVERY 8 HOURS SCHEDULED
Status: DISCONTINUED | OUTPATIENT
Start: 2023-04-21 | End: 2023-04-22 | Stop reason: HOSPADM

## 2023-04-21 RX ORDER — OXYCODONE HYDROCHLORIDE 5 MG/1
5-10 TABLET ORAL EVERY 4 HOURS PRN
Status: DISCONTINUED | OUTPATIENT
Start: 2023-04-21 | End: 2023-04-22 | Stop reason: HOSPADM

## 2023-04-21 RX ORDER — ACETAMINOPHEN 325 MG/1
975 TABLET ORAL EVERY 8 HOURS SCHEDULED
Status: DISCONTINUED | OUTPATIENT
Start: 2023-04-21 | End: 2023-04-22 | Stop reason: HOSPADM

## 2023-04-21 RX ORDER — SODIUM CHLORIDE 9 MG/ML
INJECTION, SOLUTION INTRAVENOUS CONTINUOUS
Status: DISCONTINUED | OUTPATIENT
Start: 2023-04-21 | End: 2023-04-22

## 2023-04-21 RX ADMIN — ACETAMINOPHEN 650 MG: 325 TABLET ORAL at 09:28

## 2023-04-21 RX ADMIN — SODIUM CHLORIDE: 9 INJECTION, SOLUTION INTRAVENOUS at 09:30

## 2023-04-21 RX ADMIN — OXYCODONE HYDROCHLORIDE 5 MG: 5 TABLET ORAL at 00:22

## 2023-04-21 RX ADMIN — PIPERACILLIN AND TAZOBACTAM 3.38 G: 3; .375 INJECTION, POWDER, FOR SOLUTION INTRAVENOUS at 12:37

## 2023-04-21 RX ADMIN — OXYCODONE HYDROCHLORIDE 5 MG: 5 TABLET ORAL at 09:28

## 2023-04-21 RX ADMIN — ACETAMINOPHEN 975 MG: 325 TABLET ORAL at 21:24

## 2023-04-21 RX ADMIN — PIPERACILLIN AND TAZOBACTAM 3.38 G: 3; .375 INJECTION, POWDER, FOR SOLUTION INTRAVENOUS at 17:59

## 2023-04-21 RX ADMIN — SODIUM CHLORIDE: 9 INJECTION, SOLUTION INTRAVENOUS at 21:24

## 2023-04-21 RX ADMIN — ACETAMINOPHEN 975 MG: 325 TABLET ORAL at 15:41

## 2023-04-21 RX ADMIN — AMLODIPINE BESYLATE 5 MG: 5 TABLET ORAL at 09:28

## 2023-04-21 RX ADMIN — PIPERACILLIN AND TAZOBACTAM 3.38 G: 3; .375 INJECTION, POWDER, FOR SOLUTION INTRAVENOUS at 05:13

## 2023-04-21 ASSESSMENT — ACTIVITIES OF DAILY LIVING (ADL)
ADLS_ACUITY_SCORE: 35

## 2023-04-21 NOTE — PROGRESS NOTES
[facility]    Hospitalist Progress Note      Assessment & Plan   Cristian Antonio is a 62 year old male admitted on 4/16/2023 with n/v, diarrhea    N/V/D, resolved  Abnormal LFTs, improving  Leukocytosis, .  Acute Cholecystitis per CT 4/18/23; s/p lap cholX 4/19/23  -4/18/2023 WBC further increased to 17.7; ProCal 33. persistent elevated alkaline phosphatase, bilirubin.  CT abdomen pelvis obtained consistent with acute cholecystitis. Continue Zosyn, laparoscopic cholecystectomy per general surgery without reported complications.  Increased WBC postop day 1 and 2, continues on Zosyn.  Procalcitonin 2.52  repeat WBC/CBC in a.m., afebrile, nursing notes no wound issue.  Abdominal exam with tenderness at wound site, no rebound, guarding or other peritoneal signs.  Evaluation of drain output decreased, nonpurulent.  Tolerating diet, no further back pain, decreased abdominal pain.  Bowels moving.  CR serially elevated 1.21, restart IVF.  BMP in AM.  -  SANDHYA  Hyponatremia  Anion gap acidosis  Creatinine baseline previous to have been 1-1.1. On presentation at 3.45. NA sl low at 131 on presentation. Hopeful SANDHYA 2/2 dehydration, incidentally on abdominal CT 4/18/2023 visualization of kidney, bladder without mass, stones or hydronephrosis.    UA fairly bland as above.    4/21/2023, restart IVF, encourage p.o. intake, BUNs/CR in a.m.  -     Thrombocytopenia  Platelets at 85 on presentation, baseline 2021 233.   -Platelets now normal.    DVT Prophylaxis: Ambulate every shift  Code Status: Full Code     Expected Discharge Date: 1 day off IV antibiotics, IV fluid support and pain medication  Chandrakant Du MD  Text Page (7am - 6pm, M-F)    I spent 35 minutes total time in management of care today 4/21/2023 reviewing labs, medications, interdisciplinary notes; and completing documentation of encounter and orders with Care Management including counseling/discussion with the Patient through sign   regarding persistent elevated WBC, increased CR, pain condition and Coordinating Care and plan with Nursing and Specialists, General Surgery regarding postop management and surveillance     Interval History   Abdominal pain decreased, tolerating diet, still requires intermittent oxycodone.  Bowels moving.  Ambulatory.  No further back pain.  Nursing notes no wound issues, afebrile, drain with decreased output, nonpurulent    SH: No tobacco.  Lives with wife who uses Sign Language    ROS: Complete ROS negative except as above.     -Data reviewed today: I reviewed all new labs and imaging results over the last 24 hours.    Physical Exam   Temp: 99.5  F (37.5  C) Temp src: Oral BP: (!) 141/91 Pulse: 74   Resp: 16 SpO2: 95 % O2 Device: None (Room air)    Vitals:    04/16/23 2301   Weight: 109.5 kg (241 lb 6.4 oz)     Vital Signs with Ranges  Temp:  [97.8  F (36.6  C)-99.5  F (37.5  C)] 99.5  F (37.5  C)  Pulse:  [74-83] 74  Resp:  [16] 16  BP: (134-145)/(84-91) 141/91  SpO2:  [94 %-95 %] 95 %  I/O last 3 completed shifts:  In: -   Out: 320 [Urine:300; Drains:20]    General/Constitutional:     NAD, alert, calm, cooperative . Uses sign language to communicate.  Chest/Respiratory:  Respirations nonlabored room air   Cardiovascular:  no murmur appreciated.  LE edema none  Gastrointestinal/Abdomen: Firm, minimal tenderness over wound sites, no rebound, guarding or peritoneal signs.  Drain in place, minimal output  neuro.  Gross motor tested, nonfocal,  Psych oriented, affect calm.      Medications     sodium chloride 75 mL/hr at 04/21/23 0930       amLODIPine  5 mg Oral Daily     piperacillin-tazobactam  3.375 g Intravenous Q6H     sodium chloride (PF)  3 mL Intracatheter Q8H       Data   Recent Labs   Lab 04/21/23  0648 04/20/23  1322 04/20/23  0651 04/20/23  0627 04/19/23  2249 04/19/23  1233 04/19/23  0641 04/17/23  0643 04/16/23  1733   WBC 13.9* 13.5*  --   --  14.8*  --  14.4*   < > 7.2   HGB 13.2* 14.4  --    --  13.7  --  14.7   < > 17.7   MCV 86 86  --   --  87  --  84   < > 81    208  --   --  162  --  118*   < > 85*     --  137  --   --   --  138   < > 131*   POTASSIUM 4.1  --  4.3  --   --   --  3.9   < > 3.7   CHLORIDE 103  --  106  --   --   --  106   < > 93*   CO2 25  --  22  --   --   --  23   < > 20*   BUN 18.0  --  19.1  --   --   --  23.1*   < > 81.4*   CR 1.21*  --  1.14  --   --   --  1.46*   < > 3.45*   ANIONGAP 9  --  9  --   --   --  9   < > 18*   EMILIA 8.1*  --  7.9*  --   --   --  8.5*   < > 9.0   *  --  126* 123*  --    < > 119*   < > 107*   ALBUMIN 2.6*  --  2.6*  --  2.7*  --  2.8*   < > 3.4*   PROTTOTAL 5.9*  --  5.7*  --  5.9*  --  6.3*   < > 7.3   BILITOTAL 0.8  --  0.9  --  0.8  --  1.4*   < > 1.4*   ALKPHOS 154*  --  139*  --  146*  --  198*   < > 142*   ALT 46  --  42  --  43  --  38   < > 56*   AST 36  --  29  --  35  --  26   < > 49   LIPASE  --   --   --   --   --   --   --   --  13    < > = values in this interval not displayed.

## 2023-04-21 NOTE — PROGRESS NOTES
A&O x4, deaf, needs . Up independently in room and in halls. VSS on RA except HTN. Denies nausea/vomitting, diarrhea. No BM but passing gas. CAROL drain intact to bulb suction. Voided adequately in BR. Diet upgraded to low fiber. Pain was managed with PRN tylenol and oxycodone. Discharge pending.

## 2023-04-21 NOTE — PROGRESS NOTES
Patient vital signs are at baseline:Yes, except HTN  Patient able to ambulate as they were prior to admission or with assist devices provided by therapies during their stay: Not out of bed this shift.  Patient MUST void prior to discharge: Yes  Patient able to tolerate oral intake:  Yes  Pain has adequate pain control using Oral analgesics:  Yes  Does patient have an identified : Yes  Has goal D/C date and time been discussed with patient:  TBD   Pt is alert and oriented except deaf. VSS  on RA.  Pain rated 3-4, pt refused to take pain medication this shift. Discharge pending

## 2023-04-21 NOTE — PROGRESS NOTES
"Surgery    No complaints  Reports pain 6-7/10  +bowel function   Tolerating diet  Walking in halls  Denies CP, dyspnea    Gen:  Awake, Alert, NAD  BP (!) 141/91 (BP Location: Right arm)   Pulse 74   Temp 99.5  F (37.5  C) (Oral)   Resp 16   Ht 1.956 m (6' 5\")   Wt 109.5 kg (241 lb 6.4 oz)   SpO2 95%   BMI 28.63 kg/m    Resp - Non-labored  Abdomen - soft, Erythema noted at umbilical incision. Border marked. Not warm but tender to palpation, non distended. Drain site ok, drain output 20 serosang.   Extremities - no lower extremity edema or tenderness with palpation    Wbc 13.9 (13.5)  lft's mildly elevated - stable.    A/P s/p laparoscopic cholecystectomy on 4/19/23. Developing umbilical infection.    - continue abx  - closely monitor umbilical area.  - ADAT  - encourage incentive spirometer use  - ambulate  - dispo: tomorrow    Kwesi Blankenship PA-C  Office: [phone]  Pager: [phone]      "

## 2023-04-22 VITALS
HEART RATE: 77 BPM | BODY MASS INDEX: 28.5 KG/M2 | SYSTOLIC BLOOD PRESSURE: 138 MMHG | HEIGHT: 77 IN | OXYGEN SATURATION: 96 % | TEMPERATURE: 99 F | DIASTOLIC BLOOD PRESSURE: 91 MMHG | RESPIRATION RATE: 16 BRPM | WEIGHT: 241.4 LBS

## 2023-04-22 LAB
ANION GAP SERPL CALCULATED.3IONS-SCNC: 9 MMOL/L (ref 7–15)
BACTERIA BLD CULT: NO GROWTH
BACTERIA BLD CULT: NO GROWTH
BUN SERPL-MCNC: 16.6 MG/DL (ref 8–23)
CALCIUM SERPL-MCNC: 8.6 MG/DL (ref 8.8–10.2)
CHLORIDE SERPL-SCNC: 102 MMOL/L (ref 98–107)
CREAT SERPL-MCNC: 1.09 MG/DL (ref 0.67–1.17)
DEPRECATED HCO3 PLAS-SCNC: 25 MMOL/L (ref 22–29)
ERYTHROCYTE [DISTWIDTH] IN BLOOD BY AUTOMATED COUNT: 13.3 % (ref 10–15)
GFR SERPL CREATININE-BSD FRML MDRD: 76 ML/MIN/1.73M2
GLUCOSE SERPL-MCNC: 97 MG/DL (ref 70–99)
HCT VFR BLD AUTO: 39.6 % (ref 40–53)
HGB BLD-MCNC: 13.5 G/DL (ref 13.3–17.7)
MCH RBC QN AUTO: 29.2 PG (ref 26.5–33)
MCHC RBC AUTO-ENTMCNC: 34.1 G/DL (ref 31.5–36.5)
MCV RBC AUTO: 86 FL (ref 78–100)
PLATELET # BLD AUTO: 280 10E3/UL (ref 150–450)
POTASSIUM SERPL-SCNC: 4 MMOL/L (ref 3.4–5.3)
RBC # BLD AUTO: 4.62 10E6/UL (ref 4.4–5.9)
SODIUM SERPL-SCNC: 136 MMOL/L (ref 136–145)
WBC # BLD AUTO: 12.9 10E3/UL (ref 4–11)

## 2023-04-22 PROCEDURE — 250N000013 HC RX MED GY IP 250 OP 250 PS 637: Performed by: HOSPITALIST

## 2023-04-22 PROCEDURE — 85027 COMPLETE CBC AUTOMATED: CPT | Performed by: HOSPITALIST

## 2023-04-22 PROCEDURE — 250N000011 HC RX IP 250 OP 636: Performed by: PHYSICIAN ASSISTANT

## 2023-04-22 PROCEDURE — 250N000013 HC RX MED GY IP 250 OP 250 PS 637: Performed by: STUDENT IN AN ORGANIZED HEALTH CARE EDUCATION/TRAINING PROGRAM

## 2023-04-22 PROCEDURE — 99239 HOSP IP/OBS DSCHRG MGMT >30: CPT | Performed by: HOSPITALIST

## 2023-04-22 PROCEDURE — 80048 BASIC METABOLIC PNL TOTAL CA: CPT | Performed by: HOSPITALIST

## 2023-04-22 PROCEDURE — 36415 COLL VENOUS BLD VENIPUNCTURE: CPT | Performed by: HOSPITALIST

## 2023-04-22 RX ORDER — AMLODIPINE BESYLATE 5 MG/1
5 TABLET ORAL DAILY
Qty: 30 TABLET | Refills: 0 | Status: SHIPPED | OUTPATIENT
Start: 2023-04-23 | End: 2023-05-02

## 2023-04-22 RX ADMIN — ACETAMINOPHEN 975 MG: 325 TABLET ORAL at 06:05

## 2023-04-22 RX ADMIN — PIPERACILLIN AND TAZOBACTAM 3.38 G: 3; .375 INJECTION, POWDER, FOR SOLUTION INTRAVENOUS at 00:10

## 2023-04-22 RX ADMIN — AMLODIPINE BESYLATE 5 MG: 5 TABLET ORAL at 08:33

## 2023-04-22 RX ADMIN — PIPERACILLIN AND TAZOBACTAM 3.38 G: 3; .375 INJECTION, POWDER, FOR SOLUTION INTRAVENOUS at 06:05

## 2023-04-22 RX ADMIN — ACETAMINOPHEN 975 MG: 325 TABLET ORAL at 14:56

## 2023-04-22 ASSESSMENT — ACTIVITIES OF DAILY LIVING (ADL)
ADLS_ACUITY_SCORE: 35

## 2023-04-22 NOTE — DISCHARGE SUMMARY
[facility]    Discharge Summary  Hospitalist    Date of Admission:  4/16/2023  Date of Discharge:  4/22/2023  Discharging Provider: Chandrakant Du MD  Date of Service (when I saw the patient): 04/22/23      History of Present Illness   Cristian Antonio is a 62 year old male admitted on 4/16/2023 with n/v, diarrhea found to have acute cholecystitis.     Hospital Course   Cristian Antonio was admitted on 4/16/2023.  The following problems were addressed during his hospitalization:    N/V/D, resolved  Abnormal LFTs, improving  Leukocytosis, .  Acute Cholecystitis per CT 4/18/23; s/p lap cholX 4/19/23  -4/18/2023 WBC further increased to 17.7; ProCal 33. persistent elevated alkaline phosphatase, bilirubin.  CT abdomen pelvis obtained consistent with acute cholecystitis. Continue Zosyn, laparoscopic cholecystectomy per general surgery without reported complications.  POD 3 tolerating low fiber diet, decrease pain, bowels moving.  Nursing reports no wound issues, General Surgery remove drain today.  Afebrile.  WBC downtrending today 12.9.  Patient up and ambulatory.  Per general surgery okay to discharge with follow-up at 7-10 days, transition Zosyn to Augmentin 7-day course, patient will follow-up with PCP, see below for hospital follow-up, BP recheck, started on amlodipine LE pain this hospitalization with BMP  -  SANDHYA due to dehydration, Cr normal after IVF.   Hyponatremia  Anion gap acidosis  Creatinine baseline previous to have been 1-1.1. On presentation at 3.45. NA sl low at 131 on presentation.  SANDHYA 2/2 dehydration, incidentally on abdominal CT 4/18/2023 visualization of kidney, bladder without mass, stones or hydronephrosis.    UA fairly bland as above.    4/22/2023 discharged with normal CR 1.09 after IVF, taking in p.o.'s well.  -     Thrombocytopenia, resolved  Platelets at 85 on presentation, baseline 2021 233.   -Platelets now normal.    Code Status   Full Code       Primary Care  Physician   Mark Romo    Physical Exam   Temp: 98.2  F (36.8  C) Temp src: Oral BP: (!) 134/93 Pulse: 68   Resp: 16 SpO2: 96 % O2 Device: None (Room air)    Vitals:    04/16/23 2301   Weight: 109.5 kg (241 lb 6.4 oz)     General/Constitutional:     NAD, alert, calm, cooperative. Uses sign language to communicate.  Chest/Respiratory:  Respirations nonlabored room air   Cardiovascular:  no murmur appreciated.  LE edema none  Gastrointestinal/Abdomen:  Soft, nontender, no rebound, guarding or peritoneal signs.  Drain removed.  neuro.  Gross motor tested, nonfocal,  Psych oriented, affect calm.    Discharge Disposition   Discharged to home  Condition at discharge: Fair    Consultations This Hospital Stay   SURGERY GENERAL IP CONSULT    Time Spent on this Encounter   IChandrakant MD, personally saw the patient today and spent greater than 30 minutes discharging this patient discussing discharge plans with Patient through sign , and Nursing.  Coordinating with specialties, General surgery regarding discharge medications and follow-up, completing discharge orders, medications and follow-up.    Discharge Orders      Reason for your hospital stay    Presented with nausa/vomiting and diagnosed with acute cholelithiasis [gallbladder infection.]     Follow-up and recommended labs and tests     Follow up with primary care provider, Mark Romo, within 7 days for hospital follow- up.  The following labs/tests are recommended: CBC at that time; also follow-up blood pressure, [started on blood pressure medications in hospital]  .    Follow up with Dr. Guevara, surgeon at 1-2 weeks,  they will call you for appointment date and time.  If you do not hear from them call phone # on Summary.     Activity    Your activity upon discharge: activity as tolerated and Surgery instructions on any lifting restrictions post surgery.     Diet    Follow this diet upon discharge:       Low Fiber Diet, further instructions on  "Surgery follow-up appointment.     Discharge Medications   Current Discharge Medication List      START taking these medications    Details   amLODIPine (NORVASC) 5 MG tablet Take 1 tablet (5 mg) by mouth daily Future refills by PCP Dr. Mark Romo with phone number [phone].  Qty: 30 tablet, Refills: 0    Associated Diagnoses: Hypertension, unspecified type      amoxicillin-clavulanate (AUGMENTIN) 875-125 MG tablet Take 1 tablet by mouth every 12 hours FIRST dose this evening 4/22/23  Qty: 14 tablet, Refills: 0    Associated Diagnoses: Intra-abdominal infection      oxyCODONE (ROXICODONE) 5 MG tablet Take 1 tablet (5 mg) by mouth every 4 hours as needed for severe pain  Qty: 12 tablet, Refills: 0    Associated Diagnoses: Acute cholecystitis      senna-docusate (SENOKOT-S/PERICOLACE) 8.6-50 MG tablet Take 1 tablet by mouth 2 times daily as needed for constipation  Qty: 12 tablet, Refills: 0    Associated Diagnoses: Acute cholecystitis         CONTINUE these medications which have NOT CHANGED    Details   acetaminophen (TYLENOL) 650 MG CR tablet Take 650 mg by mouth every 8 hours as needed for mild pain or fever           Allergies   Allergies   Allergen Reactions     Banana Nausea     Ibuprofen Other (See Comments)     Uncontrollable shaking for approx. 20 minutes - almost \"seizure-like\"     Data   Most Recent 3 CBC's:Recent Labs   Lab Test 04/22/23  0703 04/21/23  0648 04/20/23  1322   WBC 12.9* 13.9* 13.5*   HGB 13.5 13.2* 14.4   MCV 86 86 86    223 208      Most Recent 3 BMP's:  Recent Labs   Lab Test 04/22/23  0703 04/21/23  0648 04/20/23  0651    137 137   POTASSIUM 4.0 4.1 4.3   CHLORIDE 102 103 106   CO2 25 25 22   BUN 16.6 18.0 19.1   CR 1.09 1.21* 1.14   ANIONGAP 9 9 9   EMILIA 8.6* 8.1* 7.9*   GLC 97 104* 126*     Most Recent 2 LFT's:  Recent Labs   Lab Test 04/21/23  0648 04/20/23  0651   AST 36 29   ALT 46 42   ALKPHOS 154* 139*   BILITOTAL 0.8 0.9       "

## 2023-04-22 NOTE — PROGRESS NOTES
Patient vital signs are at baseline: Yes  Patient able to ambulate as they were prior to admission or with assist devices provided by therapies during their stay:  Yes  Patient MUST void prior to discharge:  Yes  Patient able to tolerate oral intake:  Yes  Pain has adequate pain control using Oral analgesics:  Yes  Does patient have an identified :  Yes  Has goal D/C date and time been discussed with patient: Yes    Alert and oriented x4. VSS. On RA. Clear lung sounds bilaterally, denies SOB. Abdominal lap sites CDI. Marked area on mid lap site is improving. CAROL drain was removed by surgery MD. Pt was discharged on 4/22/23. Pt was medically stable upon discharge. AVS, including medication instructions were reviewed with pt and spouse. Both pt and spouse verbally admitted to understanding the instructions. Pt was accompanied  to home by his spouse.

## 2023-04-22 NOTE — PROGRESS NOTES
"Surgery    No complaints  Reports periumbilical pain greatly improved  +bowel function   Tolerating diet  Walking in halls    Gen:  Awake, Alert, NAD  BP (!) 134/93 (BP Location: Right arm)   Pulse 68   Temp 98.2  F (36.8  C) (Oral)   Resp 16   Ht 1.956 m (6' 5\")   Wt 109.5 kg (241 lb 6.4 oz)   SpO2 96%   BMI 28.63 kg/m    Resp - Non-labored  Abdomen - soft, mild erythema noted at umbilical incision. Not warm, no significant tenderness to palpation, non distended. Drain site ok, drain output 5 serous, removed  Extremities - no lower extremity edema or tenderness with palpation    Wbc 12.9 (13.9)    A/P s/p laparoscopic cholecystectomy on 4/19/23. Concern for periumbilical infection treated with Zosyn, significantly improved today. Drain removed today.    - Ok to transition to oral Augmentin for 7 days course  - Regular diet  - Follow up in clinic in 7-10 days with Dr. Guevara  - Discussed with patient to call if he has fevers, increased redness around incision, or increased pain  - Ok to discharge today from surgical perspective    Tenzin Guevara MD, MS 04/22/23 8:53 AM   General Surgery Resident - [id number]        "

## 2023-04-22 NOTE — PROGRESS NOTES
Patient vital signs are at baseline: Yes  Patient able to ambulate as they were prior to admission or with assist devices provided by therapies during their stay: Yes  Patient MUST void prior to discharge:  Yes  Patient able to tolerate oral intake:  Yes, denies nausea and vomiting.  Pain has adequate pain control using Oral analgesics: Yes  Does patient have an identified : N/A   Has goal D/C date and time been discussed with patient: In progress    Pt is deaf; need ASL. A and O x4. VSS, On RA. Abdominal incision(lap sites) dressing CDI. Red spot around mid lap site. Marked by PA; unchanged during this shift. CAROL drain in place, CDI. Received an order to remove drain today; however, writer met with tough resistance upon removing the drain and pt was screaming in pain. Writer then stopped and redressed the site. The PA was updated. Drain to be removed by PA tomorrow. Pt is ind in room.

## 2023-04-22 NOTE — PLAN OF CARE
Pt A&Ox4; VSS on RA. Pt deaf but able to make needs known. Independent in room. NS running @ 75 mL/hr; on intermittent IV abx. Lap sites intact; dressing CDI. CAROL drain still in place but no drainage; will discontinue today.Pain managed with current regimen. Pt ambulated outside of room this shift. Discharge pending.

## 2023-04-24 ENCOUNTER — PATIENT OUTREACH (OUTPATIENT)
Dept: FAMILY MEDICINE | Facility: CLINIC | Age: 63
End: 2023-04-24
Payer: COMMERCIAL

## 2023-04-24 NOTE — TELEPHONE ENCOUNTER
What type of discharge? Inpatient  Risk of Hospital admission or ED visit: 7%  Is a TCM episode required? No  When should the patient follow up with PCP? 7 days of discharge.   Follow-up and recommended labs and tests  Follow up with primary care provider, Mark Romo, within 7 days for  hospital follow- up. The following labs/tests are recommended: CBC at  that time; also follow-up blood pressure, [started on blood pressure  medications in hospital]    Jimena Brink RN  [facility]

## 2023-05-01 ENCOUNTER — TELEPHONE (OUTPATIENT)
Dept: SURGERY | Facility: CLINIC | Age: 63
End: 2023-05-01
Payer: COMMERCIAL

## 2023-05-01 NOTE — TELEPHONE ENCOUNTER
[facility]  Post op call note - Laparoscopic Cholecystectomy  No Answer    Cristian Antonio was called for an update regarding his recovery.  He underwent surgery on 4/19/23.      There was no answer and a message was left with the  who will provide a video message requesting a return call if he has any further questions regarding her surgery or recovery.      Kwesi Blankenship PA-C  Office: [phone]    Pathology:  Gallbladder: Laparoscopic cholecystectomy:  - Acute cholecystitis with abscess formation  - Cholelithiasis

## 2023-05-02 ENCOUNTER — OFFICE VISIT (OUTPATIENT)
Dept: FAMILY MEDICINE | Facility: CLINIC | Age: 63
End: 2023-05-02
Payer: COMMERCIAL

## 2023-05-02 VITALS
HEIGHT: 77 IN | DIASTOLIC BLOOD PRESSURE: 88 MMHG | OXYGEN SATURATION: 98 % | HEART RATE: 77 BPM | WEIGHT: 238.6 LBS | SYSTOLIC BLOOD PRESSURE: 132 MMHG | RESPIRATION RATE: 16 BRPM | TEMPERATURE: 98.1 F | BODY MASS INDEX: 28.17 KG/M2

## 2023-05-02 DIAGNOSIS — Z90.49 STATUS POST LAPAROSCOPIC CHOLECYSTECTOMY: Primary | ICD-10-CM

## 2023-05-02 DIAGNOSIS — I10 HYPERTENSION, UNSPECIFIED TYPE: ICD-10-CM

## 2023-05-02 DIAGNOSIS — M85.661 OTHER CYST OF BONE, RIGHT LOWER LEG: ICD-10-CM

## 2023-05-02 DIAGNOSIS — Z12.11 SCREEN FOR COLON CANCER: ICD-10-CM

## 2023-05-02 LAB
LDLC SERPL DIRECT ASSAY-MCNC: 92 MG/DL
TSH SERPL DL<=0.005 MIU/L-ACNC: 1.51 UIU/ML (ref 0.3–4.2)

## 2023-05-02 PROCEDURE — 83721 ASSAY OF BLOOD LIPOPROTEIN: CPT | Performed by: FAMILY MEDICINE

## 2023-05-02 PROCEDURE — 36415 COLL VENOUS BLD VENIPUNCTURE: CPT | Performed by: FAMILY MEDICINE

## 2023-05-02 PROCEDURE — 99204 OFFICE O/P NEW MOD 45 MIN: CPT | Performed by: FAMILY MEDICINE

## 2023-05-02 PROCEDURE — 84443 ASSAY THYROID STIM HORMONE: CPT | Performed by: FAMILY MEDICINE

## 2023-05-02 PROCEDURE — 80053 COMPREHEN METABOLIC PANEL: CPT | Performed by: STUDENT IN AN ORGANIZED HEALTH CARE EDUCATION/TRAINING PROGRAM

## 2023-05-02 RX ORDER — AMLODIPINE BESYLATE 5 MG/1
5 TABLET ORAL DAILY
Qty: 90 TABLET | Refills: 1 | Status: SHIPPED | OUTPATIENT
Start: 2023-05-02 | End: 2023-07-17

## 2023-05-02 ASSESSMENT — PATIENT HEALTH QUESTIONNAIRE - PHQ9
SUM OF ALL RESPONSES TO PHQ QUESTIONS 1-9: 4
SUM OF ALL RESPONSES TO PHQ QUESTIONS 1-9: 4
10. IF YOU CHECKED OFF ANY PROBLEMS, HOW DIFFICULT HAVE THESE PROBLEMS MADE IT FOR YOU TO DO YOUR WORK, TAKE CARE OF THINGS AT HOME, OR GET ALONG WITH OTHER PEOPLE: NOT DIFFICULT AT ALL

## 2023-05-02 ASSESSMENT — PAIN SCALES - GENERAL: PAINLEVEL: NO PAIN (0)

## 2023-05-02 NOTE — PROGRESS NOTES
"  Assessment & Plan     Screen for colon cancer  Will discus when her return for CPE    Hypertension, unspecified type  Improving, will review the lab and update pt, will keep him on CCB and recheck at CPE  - amLODIPine (NORVASC) 5 MG tablet; Take 1 tablet (5 mg) by mouth daily Future refills by PCP Dr. Mark Romo with phone number [phone].  - LDL cholesterol direct; Future  - TSH with free T4 reflex; Future  - Albumin Random Urine Quantitative with Creat Ratio; Future    Status post laparoscopic cholecystectomy  Improving and stable, has no wound drainage nor infection, not tender to touch, encouraged him to schedule with surgery as was suggested  Referral placed    - Adult General Surg Referral; Future    Other cyst of bone, right lower leg  On right inner calf, has no pain nor tenderness, hasn't changed,  Will continue monitoring and consider US if changes            MED REC REQUIRED  Post Medication Reconciliation Status:       BMI:   Estimated body mass index is 28.29 kg/m  as calculated from the following:    Height as of this encounter: 1.956 m (6' 5\").    Weight as of this encounter: 108.2 kg (238 lb 9.6 oz).   Weight management plan: Discussed healthy diet and exercise guidelines    FUTURE APPOINTMENTS:       - Follow-up visit in 6 months  For CPE    Dale Aiken MD  [facility]    Nancy   Cristian is a 62 year old, presenting for the following health issues:  ER F/U         View : No data to display.              Hospitals in [place]       Hospital Follow-up Visit:    Hospital/Nursing Home/IP Rehab Facility: [facility]  Date of Admission: 04/16/23  Date of Discharge: 04/22/23  Reason(s) for Admission:   N/V/D, resolved  Abnormal LFTs, improving  Leukocytosis, .  Acute Cholecystitis per CT 4/18/23; s/p lap cholX 4/19/23    Was your hospitalization related to COVID-19? No   Problems taking medications regularly:  None  Medication changes since discharge: " "None  Problems adhering to non-medication therapy:  None    Summary of hospitalization:  [facility] discharge summary reviewed  Diagnostic Tests/Treatments reviewed.  Follow up needed: none  Other Healthcare Providers Involved in Patient s Care:         None  Update since discharge: improved.         Plan of care communicated with patient           Review of Systems   Constitutional, HEENT, cardiovascular, pulmonary, gi and gu systems are negative, except as otherwise noted.      Objective    /88 (BP Location: Left arm, Patient Position: Sitting, Cuff Size: Adult Large)   Pulse 77   Temp 98.1  F (36.7  C)   Resp 16   Ht 1.956 m (6' 5\")   Wt 108.2 kg (238 lb 9.6 oz)   SpO2 98%   BMI 28.29 kg/m    Body mass index is 28.29 kg/m .  Physical Exam   GENERAL: healthy, alert and no distress  EYES: Eyes grossly normal to inspection, PERRL and conjunctivae and sclerae normal  HENT: ear canals and TM's normal, nose and mouth without ulcers or lesions  NECK: no adenopathy, no asymmetry, masses, or scars and thyroid normal to palpation  RESP: lungs clear to auscultation - no rales, rhonchi or wheezes  CV: regular rate and rhythm, normal S1 S2, no S3 or S4, no murmur, click or rub, no peripheral edema and peripheral pulses strong  ABDOMEN: soft, nontender, no hepatosplenomegaly, no masses and bowel sounds normal  MS: no gross musculoskeletal defects noted, no edema                    "

## 2023-05-02 NOTE — LETTER
May 4, 2023      Cristian Antonio  [address] [facility] [address]        Dear ,    We are writing to inform you of your test results.    You maybe able to check the lab results via [employer], it showed your lab results including thyroid function and LDL cholesterol/renal function with urine tests were all normal.     Resulted Orders   LDL cholesterol direct   Result Value Ref Range    LDL Cholesterol Direct 92 <100 mg/dL      Comment:      Age 2-19 years:  Desirable: 0-110 mg/dL   Borderline high: 110-129 mg/dL   High: >= 130 mg/dL    Age 20 years and older:  Desirable: <100mg/dL  Above desirable: 100-129 mg/dL   Borderline high: 130-159 mg/dL   High: 160-189 mg/dL   Very high: >= 190 mg/dL   TSH with free T4 reflex   Result Value Ref Range    TSH 1.51 0.30 - 4.20 uIU/mL   Albumin Random Urine Quantitative with Creat Ratio   Result Value Ref Range    Creatinine Urine mg/dL 111.0 mg/dL      Comment:      The reference ranges have not been established in urine creatinine. The results should be integrated into the clinical context for interpretation.    Albumin Urine mg/L <12.0 mg/L      Comment:      The reference ranges have not been established in urine albumin. The results should be integrated into the clinical context for interpretation.    Albumin Urine mg/g Cr        Comment:      Unable to calculate, urine albumin and/or urine creatinine is outside detectable limits.  Microalbuminuria is defined as an albumin:creatinine ratio of 17 to 299 for males and 25 to 299 for females. A ratio of albumin:creatinine of 300 or higher is indicative of overt proteinuria.  Due to biologic variability, positive results should be confirmed by a second, first-morning random or 24-hour timed urine specimen. If there is discrepancy, a third specimen is recommended. When 2 out of 3 results are in the microalbuminuria range, this is evidence for incipient nephropathy and warrants increased efforts at glucose control,  blood pressure control, and institution of therapy with an angiotensin-converting-enzyme (ACE) inhibitor (if the patient can tolerate it).         If you have any questions or concerns, please call the clinic at the number listed above.       Sincerely,      Dale Aiken MD

## 2023-05-03 ENCOUNTER — APPOINTMENT (OUTPATIENT)
Dept: LAB | Facility: CLINIC | Age: 63
End: 2023-05-03
Payer: COMMERCIAL

## 2023-05-03 ENCOUNTER — TELEPHONE (OUTPATIENT)
Dept: FAMILY MEDICINE | Facility: CLINIC | Age: 63
End: 2023-05-03

## 2023-05-03 ENCOUNTER — OFFICE VISIT (OUTPATIENT)
Dept: SURGERY | Facility: CLINIC | Age: 63
End: 2023-05-03
Payer: COMMERCIAL

## 2023-05-03 DIAGNOSIS — K81.0 ACUTE CHOLECYSTITIS: ICD-10-CM

## 2023-05-03 DIAGNOSIS — Z09 SURGERY FOLLOW-UP EXAMINATION: Primary | ICD-10-CM

## 2023-05-03 LAB
ALBUMIN SERPL BCG-MCNC: 3.8 G/DL (ref 3.5–5.2)
ALP SERPL-CCNC: 121 U/L (ref 40–129)
ALT SERPL W P-5'-P-CCNC: 45 U/L (ref 10–50)
ANION GAP SERPL CALCULATED.3IONS-SCNC: 16 MMOL/L (ref 7–15)
AST SERPL W P-5'-P-CCNC: 33 U/L (ref 10–50)
BILIRUB SERPL-MCNC: 0.8 MG/DL
BUN SERPL-MCNC: 21 MG/DL (ref 8–23)
CALCIUM SERPL-MCNC: 9.4 MG/DL (ref 8.8–10.2)
CHLORIDE SERPL-SCNC: 102 MMOL/L (ref 98–107)
CREAT SERPL-MCNC: 1.14 MG/DL (ref 0.67–1.17)
DEPRECATED HCO3 PLAS-SCNC: 19 MMOL/L (ref 22–29)
GFR SERPL CREATININE-BSD FRML MDRD: 72 ML/MIN/1.73M2
GLUCOSE SERPL-MCNC: 78 MG/DL (ref 70–99)
POTASSIUM SERPL-SCNC: 4.8 MMOL/L (ref 3.4–5.3)
PROT SERPL-MCNC: 8 G/DL (ref 6.4–8.3)
SODIUM SERPL-SCNC: 137 MMOL/L (ref 136–145)

## 2023-05-03 PROCEDURE — 99024 POSTOP FOLLOW-UP VISIT: CPT | Performed by: STUDENT IN AN ORGANIZED HEALTH CARE EDUCATION/TRAINING PROGRAM

## 2023-05-03 PROCEDURE — 82570 ASSAY OF URINE CREATININE: CPT | Performed by: FAMILY MEDICINE

## 2023-05-03 PROCEDURE — 82043 UR ALBUMIN QUANTITATIVE: CPT | Performed by: FAMILY MEDICINE

## 2023-05-03 NOTE — TELEPHONE ENCOUNTER
Picked up completed forms, forms at writers desk.  Called pt, no answer.    Fabienne EDUARDO    [facility]

## 2023-05-03 NOTE — PROGRESS NOTES
General Surgery Post-op Follow up Clinic Note:      Cristian Antonio is a 62 year old who is following up in clinic 10 days after being discharged from the hospital s/p lap cholecystectomy on 4/19/2023.    S: History was taken with the help of an  -he reports he is doing very well.  He is eating very well.  He is having good bowel movements.  He denies any pain.    Abdomen: incisions are clean, dry, intact and healing well.  Palpable healing ridge at the umbilicus.    Pathology:   Final Diagnosis   Gallbladder: Laparoscopic cholecystectomy:  - Acute cholecystitis with abscess formation  - Cholelithiasis        A/P:  Cristian Antonio is recovering from a Laparoscopic Cholecystectomy. I advised him to slowly return to regular activity. I expect he will make a complete recovery without issues. We reviewed his pathology today as well.  I have added a CMP to his labs that were drawn yesterday to just ensure resolution or significant improvement of his liver labs.    Thank you for the opportunity to help in their care.    Carrillo Guevara MD   [facility]  [phone]    Please route or send letter to:  Primary Care Provider (PCP) and Referring Provider

## 2023-05-03 NOTE — RESULT ENCOUNTER NOTE
Called and left VM with patient. CMP looks normal, which is good. Nothing further to do.    Dr. Guevara

## 2023-05-04 LAB
CREAT UR-MCNC: 111 MG/DL
MICROALBUMIN UR-MCNC: <12 MG/L
MICROALBUMIN/CREAT UR: NORMAL MG/G{CREAT}

## 2023-05-05 NOTE — TELEPHONE ENCOUNTER
Pt stopped by FD on 05/04/2023 to  ppw.  Brought ppw up to FD.    Fabienne EDUARDO    [facility]

## 2023-07-17 ENCOUNTER — OFFICE VISIT (OUTPATIENT)
Dept: FAMILY MEDICINE | Facility: CLINIC | Age: 63
End: 2023-07-17
Payer: COMMERCIAL

## 2023-07-17 ENCOUNTER — TELEPHONE (OUTPATIENT)
Dept: FAMILY MEDICINE | Facility: CLINIC | Age: 63
End: 2023-07-17

## 2023-07-17 ENCOUNTER — ANCILLARY PROCEDURE (OUTPATIENT)
Dept: GENERAL RADIOLOGY | Facility: CLINIC | Age: 63
End: 2023-07-17
Attending: PHYSICIAN ASSISTANT
Payer: COMMERCIAL

## 2023-07-17 VITALS
RESPIRATION RATE: 18 BRPM | DIASTOLIC BLOOD PRESSURE: 95 MMHG | BODY MASS INDEX: 29.54 KG/M2 | OXYGEN SATURATION: 97 % | WEIGHT: 242.6 LBS | TEMPERATURE: 97.3 F | HEART RATE: 74 BPM | SYSTOLIC BLOOD PRESSURE: 161 MMHG | HEIGHT: 76 IN

## 2023-07-17 DIAGNOSIS — M25.561 CHRONIC PAIN OF RIGHT KNEE: ICD-10-CM

## 2023-07-17 DIAGNOSIS — G89.29 CHRONIC PAIN OF RIGHT KNEE: ICD-10-CM

## 2023-07-17 DIAGNOSIS — Z23 NEED FOR TDAP VACCINATION: ICD-10-CM

## 2023-07-17 DIAGNOSIS — I10 BENIGN ESSENTIAL HYPERTENSION: Primary | ICD-10-CM

## 2023-07-17 PROCEDURE — 99214 OFFICE O/P EST MOD 30 MIN: CPT | Mod: 25 | Performed by: PHYSICIAN ASSISTANT

## 2023-07-17 PROCEDURE — 73562 X-RAY EXAM OF KNEE 3: CPT | Mod: TC | Performed by: RADIOLOGY

## 2023-07-17 PROCEDURE — 90471 IMMUNIZATION ADMIN: CPT | Performed by: PHYSICIAN ASSISTANT

## 2023-07-17 PROCEDURE — 90715 TDAP VACCINE 7 YRS/> IM: CPT | Performed by: PHYSICIAN ASSISTANT

## 2023-07-17 RX ORDER — AMLODIPINE BESYLATE 5 MG/1
5 TABLET ORAL DAILY
Qty: 90 TABLET | Refills: 1 | Status: SHIPPED | OUTPATIENT
Start: 2023-07-17

## 2023-07-17 ASSESSMENT — PAIN SCALES - GENERAL: PAINLEVEL: MODERATE PAIN (5)

## 2023-07-17 NOTE — PROGRESS NOTES
Addendum:  XR shows degenerative narrowing medial compartment of the right knee. Referred to ortho  Marianna Gil PA-C on 7/17/2023 at 12:27 PM       Assessment & Plan     Benign essential hypertension  Chronic issue that has not been treated consistently.  Has not been taking amlodipine, was not aware he was to continue taking it. He is feeling well and currently asymptomatic.   Discussed decreasing salt intake and start amlodipine 5 mg once daily with plan to recheck BP in 2-4 weeks   - amLODIPine (NORVASC) 5 MG tablet  Dispense: 90 tablet; Refill: 1    Chronic pain of right knee  3 months of intermittent knee pain without known injury. No prior imaging done. Will start with x-ray, further recommendations based on result. Consider ortho vs PT referral. OK to use tylenol if needed for pain.   - XR Knee Right 3 Views    Need for Tdap vaccination  - TDAP VACCINE (Adacel, Boostrix)    Follow up in 2-4 weeks for BP recheck, sooner prn      Marianna Gil PA-C  [facility]   Cristian is a 63 year old, presenting for the following health issues:  Knee Pain        7/17/2023    10:04 AM   Additional Questions   Roomed by Darrick   Accompanied by      Knee Pain    History of Present Illness       Reason for visit:  My knee    He eats 2-3 servings of fruits and vegetables daily.He consumes 2 sweetened beverage(s) daily.He exercises with enough effort to increase his heart rate 10 to 19 minutes per day.  He exercises with enough effort to increase his heart rate 3 or less days per week. He is missing 2 dose(s) of medications per week.     Right knee pain  3 months of pain, worsening  No injury or unusual activity   Pain with walking and changing position   Has tried tylenol which is helpful but feels he's taking a lot and doesn't want to just take medications - taking 600 mg twice daily prn, depends on the day   No prior imaging   Sometimes knee feels like it's going to give  "out  Sometimes it feels fine   Works at post [employer], walking around, moving and lifting things in the back  Looking forward to residential soon     Hypertension   Prescribed amlodipine after cholecystectomy   Has not been taking  Was not aware to continue it  Decent amount of salt in diet  Alcohol is once in awhile  No chest pain or shortness of breath     Review of Systems   Constitutional, HEENT, cardiovascular, pulmonary, gi and gu systems are negative, except as otherwise noted.      Objective    BP (!) 162/100   Pulse 74   Temp 97.3  F (36.3  C) (Tympanic)   Resp 18   Ht 1.92 m (6' 3.59\")   Wt 110 kg (242 lb 9.6 oz)   SpO2 97%   BMI 29.85 kg/m    Body mass index is 29.85 kg/m .  Physical Exam   GENERAL: healthy, alert and no distress  RESP: lungs clear to auscultation - no rales, rhonchi or wheezes  CV: regular rate and rhythm, normal S1 S2, no S3 or S4, no murmur, click or rub, no peripheral edema and peripheral pulses strong  MS: right knee without obvious swelling, no erythema or warmth, no tenderness along the joint line, full ROM, some crepitus medially, negative anterior and posterior drawer, negative McMurrays  SKIN: no suspicious lesions or rashes on exposed skin  NEURO: Normal strength and tone, mentation intact and speech normal  PSYCH: mentation appears normal, affect normal/bright              "

## 2023-07-17 NOTE — PATIENT INSTRUCTIONS
Please start taking Amlodipine 5 mg once daily for your blood pressure     I will call with x-ray results and plan     Follow up in 2-4 weeks to recheck blood pressure

## 2023-07-17 NOTE — PROGRESS NOTES
{PROVIDER CHARTING PREFERENCE:483743}    Nancy Foster is a 63 year old, presenting for the following health issues:  No chief complaint on file.         No data to display              History of Present Illness       Reason for visit:  My knee    He eats 2-3 servings of fruits and vegetables daily.He consumes 2 sweetened beverage(s) daily.He exercises with enough effort to increase his heart rate 10 to 19 minutes per day.  He exercises with enough effort to increase his heart rate 3 or less days per week. He is missing 2 dose(s) of medications per week.       {SUPERLIST (Optional):083152}  {additonal problems for provider to add (Optional):833470}      Review of Systems   {ROS COMP (Optional):001792}      Objective    There were no vitals taken for this visit.  There is no height or weight on file to calculate BMI.  Physical Exam   {Exam List (Optional):969834}    {Diagnostic Test Results (Optional):897713}    {AMBULATORY ATTESTATION (Optional):685142}

## 2023-07-17 NOTE — TELEPHONE ENCOUNTER
----- Message from Marianna Gil PA-C sent at 7/17/2023 12:27 PM CDT -----  Please let patient know:     X-ray shows arthritis and narrowing of the inner aspect of the knee (where I felt the crunching). No fractures or significant swelling.     I would recommend a referral to orthopedics which I have placed, he should get a call to schedule this. OK to use tylenol if needed for pain in the interim.     Marianna Gil PA-C on 7/17/2023 at 12:26 PM

## 2023-07-18 NOTE — TELEPHONE ENCOUNTER
Patient Contact     Attempt # 1     Was call answered?    No  Unable to leave vm, number just stated they were unavailable at this time.     On call back:      -Relay message from Marianna Gil PA-C, see below.    Sabine MUÑIZ RN  [facility]

## 2023-07-19 NOTE — TELEPHONE ENCOUNTER
Patient Contact    Attempt # 2 with ASL Interpretor    Was call answered?  No.  Left message on voicemail with information to call me back.

## 2023-07-24 NOTE — TELEPHONE ENCOUNTER
Attempted to get . None were available at this time. Will attempt later.    Mirlande KYLE RN  [facility] Triage Team

## 2023-07-25 NOTE — TELEPHONE ENCOUNTER
Patient Contact    Attempt # 3 via .     Was call answered?  No.  Left message on voicemail with information to call me back.    Upon callback, please review vince guerrero notes; referral and scheduling.     Pt does not have mychart; unable to send note prior to signing encounter per protocol. Attempted to contact pt x 3 and left 3 voicemails. Routing to provider as FYI and signing encounter.

## 2023-07-27 NOTE — TELEPHONE ENCOUNTER
Patient calling clinic back with ASL Interpretor. Relayed result note to patient and referral information. Patient agrees and will schedule appt.

## 2023-09-20 ENCOUNTER — OFFICE VISIT (OUTPATIENT)
Dept: ORTHOPEDICS | Facility: CLINIC | Age: 63
End: 2023-09-20
Payer: COMMERCIAL

## 2023-09-20 VITALS — WEIGHT: 242 LBS | HEIGHT: 76 IN | BODY MASS INDEX: 29.47 KG/M2

## 2023-09-20 DIAGNOSIS — M25.361 INSTABILITY OF RIGHT KNEE JOINT: ICD-10-CM

## 2023-09-20 DIAGNOSIS — M17.11 PRIMARY OSTEOARTHRITIS OF RIGHT KNEE: Primary | ICD-10-CM

## 2023-09-20 PROCEDURE — 20610 DRAIN/INJ JOINT/BURSA W/O US: CPT | Mod: RT | Performed by: FAMILY MEDICINE

## 2023-09-20 PROCEDURE — T1013 SIGN LANG/ORAL INTERPRETER: HCPCS | Mod: U3

## 2023-09-20 PROCEDURE — 99204 OFFICE O/P NEW MOD 45 MIN: CPT | Mod: 25 | Performed by: FAMILY MEDICINE

## 2023-09-20 RX ORDER — LIDOCAINE HYDROCHLORIDE 10 MG/ML
4 INJECTION, SOLUTION INFILTRATION; PERINEURAL
Status: SHIPPED | OUTPATIENT
Start: 2023-09-20

## 2023-09-20 RX ORDER — TRIAMCINOLONE ACETONIDE 40 MG/ML
40 INJECTION, SUSPENSION INTRA-ARTICULAR; INTRAMUSCULAR
Status: SHIPPED | OUTPATIENT
Start: 2023-09-20

## 2023-09-20 RX ADMIN — TRIAMCINOLONE ACETONIDE 40 MG: 40 INJECTION, SUSPENSION INTRA-ARTICULAR; INTRAMUSCULAR at 10:36

## 2023-09-20 RX ADMIN — LIDOCAINE HYDROCHLORIDE 4 ML: 10 INJECTION, SOLUTION INFILTRATION; PERINEURAL at 10:36

## 2023-09-20 NOTE — PATIENT INSTRUCTIONS
Thank you for choosing [facility]    [facility]  [place] [address] [address] [facility], 4th [place], [address] [place] [address]   [phone]       APPOINTMENTS: [phone]    CARE QUESTIONS: [phone],    BILLING QUESTIONS: [phone]    FAX NUMBER: [phone]        Follow up: As needed if symptoms do not improve      1. Primary osteoarthritis of right knee        Physical Therapy orders have been placed with [facility] (formally [facility])  You can call [phone] to schedule at your convenience.       Steroid Injection Information:    A corticosteroid injection was administered at your visit today.  The area of injection may be sore, slightly swollen for 1-2 days afterward.  Immediately after injection, you may have an area of numbness, which is caused by the local anesthetic, lidocaine (similar to novacaine) in the shot.  This medicine will wear off in about 4 hours.  In addition to the lidocaine, a steroid medication was injected, called triamcinolone acetate.  This medication is intended to provide long-acting antiinflammatory / pain relief.  It may take 2-5 days for this medication to provide noticeable relief.      After an injection, Dr. Walton recommends:    Protecting the area wearing a bandage or gauze pad for at least 24 hours.    Using ice; ice bag or frozen vegetables over the injection site every one to two hours when able (for 15 minutes at a time).      Avoid any strenuous activity even if the knee is already feeling better immediately afterward. Light stretching is encouraged but refrain from home exercise program and increasing activity level for about 48 hours.    Avoid soaking in a hot tub, bath, or pool for 48 hours after injection. Showering is fine!    Watch for signs of infection, including increasing pain, redness  and swelling that last more than 48 hours after injection.             KNEE OSTEOARTHRITIS    WHAT IS KNEE OSTEOARTHRITIS?    Osteoarthritis is a disease in which the cartilage in your joints breaks down. Cartilage is tissue that lines and cushions the surface of joints. It covers the ends of bones and allows free movement of joints. If joint cartilage gets rough or wears away, the roughened cartilage or bone surfaces grind against each other. The joint gets irritated and swollen (inflamed). Sometimes the irritation causes abnormal growths of bone, called spurs.    Osteoarthritis most often affects joints in the feet, knees, hips, lower back, or fingers. It usually affects only one or a few joints at a time. It is a lifelong problem that can get worse over time. However, you can relieve symptoms and prevent or slow down joint damage by following your healthcare provider s treatment plan and taking care of yourself.    WHAT IS THE CAUSE?    The exact cause of osteoarthritis is not known. Things that may cause or contribute to osteoarthritis are:    Aging. Osteoarthritis slowly gets worse as you get older. Symptoms are usually not noticed until middle age.  Too much wear on joints. Obesity, bad posture, and overuse can cause extra wear on joints.  Injuries to joints from sports or accidents  Pressure. Heavy lifting or contact sports can put pressure on joints and damage the cartilage.  Genes you have inherited. Genes are inside each cell of your body and are passed from parents to children. They contain the information that tells your body how to develop and work. A family history of osteoarthritis can double your risk of having osteoarthritis.    WHAT ARE THE SYMPTOMS?    Symptoms may include:    Mild to severe pain in a joint, which may be worse after long periods of bending, lifting or not moving  Creaking or grating sound in the joint  Swelling, stiffness, or limited movement of the joint, especially in the  morning  Weakness in muscles around the sore joint from lack of use  Changes in the shape of the joint  The pain of osteoarthritis starts with activity and continues after you stop the activity. The stiffness of osteoarthritis wears off quickly (usually within 15 to 30 minutes) once you get moving in the morning.    HOW IS IT DIAGNOSED?    Your healthcare provider will ask about your symptoms and medical history and examine you. If there is a question about what type of arthritis you have or how severe your osteoarthritis is, other tests may include:    Blood tests  Joint aspiration, which uses a needle to take fluid from a joint for testing  X-rays  You may have other tests or scans to check for other possible causes of your symptoms.    HOW IS IT TREATED?    There is no cure for osteoarthritis, but treatment can help:    Relieve pain and stiffness  Reduce swelling  Keep the joints working properly  Stop or slow down damage to the joints  There are many ways to treat osteoarthritis.    Medicine    Nonprescription pain medicine, such as acetaminophen or nonsteroidal anti-inflammatory drugs (NSAIDs), may help relieve pain. NSAIDs are available in pills, creams, and patches.    Acetaminophen may cause liver damage or other problems. Don t take more than 4000 milligrams (mg) in 24 hours. To make sure you don t take too much, check other medicines you take to see if they also contain acetaminophen. Unless recommended by your healthcare provider, you should not take this medicine for more than 10 days. Ask your provider if you need to avoid drinking alcohol while taking this medicine.    NSAIDs, such as ibuprofen, naproxen, ketoprofen, and aspirin, may cause stomach bleeding and other problems. These risks increase with age. Read the label and take as directed. Unless recommended by your healthcare provider, you should not take this medicine for more than 10 days.    Steroid medicine may be prescribed to decrease pain  and swelling. It can be given as a pill, cream or ointment, or shot. Using a steroid for a long time can have serious side effects. Take steroid medicine exactly as your healthcare provider prescribes. Don t take more or less of it than prescribed by your provider and don t take it longer than prescribed. Don t stop taking a steroid without your provider's approval. You may have to lower your dosage slowly before stopping it.  Hyaluronic acid may be injected into your knee if you have arthritis in your knee. It helps your knee move more easily.    Exercise  Three types of exercise may help:    Range-of-motion exercises are gentle stretching exercises that help you move each joint as far as possible. Examples include low-speed bike riding, janki chi, and yoga. Range-of-motion exercises help you keep or improve your flexibility and relieve stiffness.  Strengthening exercise, such as weight training, makes muscles and tendons stronger. Strong muscles and tendons support joints better. You will be able to move more easily and with less pain.    Aerobic or endurance exercise at a moderate pace, such as walking or bicycle riding, improves your overall health and helps control your weight. Exercise in a warm swimming pool is a very helpful option. The water supports your weight while you move, and the warmth helps improve joint movement.    Talk with your healthcare provider before you start an exercise program. Too much exercise too soon or even at the wrong time of day may make arthritis worse. Your provider may refer you to a physical therapist to design a program that is right for you.    Surgery    Your provider may advise arthroscopy, which is a type of surgery done with a small scope inserted into your joint. Your provider can look directly at your joint and sometimes make helpful repairs, such as removing bone spurs, without having to cut open the joint.    If you have a joint that is severely damaged, surgery may be  done to replace your joint with an artificial joint.    Other treatments    Your healthcare provider may recommend physical or occupational therapy to treat pain and help you have better use of your joints.  Although the evidence is not conclusive, some people seem to benefit from the natural remedies glucosamine and chondroitin sulfate. Acupuncture may also help reduce pain and stiffness in the joints.  Transcutaneous electrical nerve stimulation (TENS) may relieve some types of arthritis pain. TENS directs mild electric pulses through the skin to nerves in the painful area.  Sometimes it may help to use a splint or brace to rest the joint and protect it from injury.  HOW CAN I TAKE CARE OF MYSELF?    Follow the full course of treatment prescribed by your healthcare provider.  Rest your joints when they are hot, swollen, or painful.    Learn how to move in ways that are easier on your joints. Be open to using devices to help you. Helpful devices may include canes and walkers; bath seats and grab bars for the bathtub; and larger  on tools, eating utensils, pens, and pencils. Velcro fasteners on clothes and shoes can be very useful, too.    Rubbing deep-heat creams on a painful joint can give short-term relief. Putting an ice pack on the joint once or twice a day can also help relieve pain. See which works best for you. Some people get relief by alternating heat and cold packs. Hot paraffin baths can help symptoms in the hands and feet.    Eat a healthy diet. Ask your provider about the benefits of talking to a dietician to learn what you need in a healthy diet.    Try to keep a healthy weight. If you are overweight, lose weight. Losing some weight can reduce the stress on your joints.    If you smoke, try to quit. Talk to your healthcare provider about ways to quit smoking.    Try to get at least 7 to 9 hours of sleep each night.  Join a support group or take classes on how to manage your arthritis.    Ask your  healthcare provider:  How and when you will hear your test results  What activities you should avoid and when you can return to normal activities  What symptoms or problems you should watch for and what to do if you have them  Make sure you know when you should come back for a checkup.    Developed by [employer].  Published by [employer].  Copyright  2014 [employer] and/or one of its subsidiaries. All rights reserved.    KEY FACTS ABOUT MENISCAL TEARS  Click to enlarge A meniscal tear can be caused by a sudden activity that twists or tears a ligament, such as a forced twisting motion of your knee, or a fall or impact during football, basketball, or soccer.    Straightening your knee further than it can normally be straightened (hyperextension) or trouble bending, like when you land from a jump, is also another cause of a meniscal tear.  A loud, painful pop at the time of the injury is very common. It causes an immediate swelling and pain around the knee as if loose or unstable.    WHAT IS MENISCAL TEAR?  A meniscal tear is tear to the meniscal ligament that keeps the knee from bending inward. The meniscal, along with other ligaments, keeps your knee and leg bones in place when you walk or run. When a ligament is injured, it can be stretched, partially torn, or completely torn. Complete tears make the knee joint very loose and unstable.    A ligament injury is also called a sprain.    HOW IS MENISCAL TEAR DIAGNOSED AND TREATED?  A physician will examine the knee, ligaments, and tissue to make an initial assessment. Typically, he or she will recommend a combination of X-rays, CT scans, or MRIs to determine the exact extent of the damage.     You will need to change or stop doing the activities that cause pain until the ligament has healed.    If you have swelling in your joint, your healthcare team may need to remove fluid from your knee with a needle and syringe.  Your care team may wrap an elastic  bandage around your knee to keep the swelling from getting worse. You may need to keep your knee in a knee immobilizer and use crutches to protect your knee while you heal.  For complete tears, you and your healthcare team will decide if you should have intense rehabilitation therapy or if you should have surgery followed by rehab. A torn meniscal cannot be sewn back together. The ligament must be surgically reconstructed by taking ligaments or tendons from another part of your leg and connecting them to the thighbone and shinbone.  If you have a completely torn mensical and it is not repaired with surgery, the effects will be life-long. Your knee may feel loose and feel like it will give way when you are running and making quick turns. Rehabilitation exercises and a special brace will help improve these symptoms. If you can do your normal activities without pain and are willing to give up activities that put extra stress on your knee, you may not need surgery.    You may consider having reconstructive meniscal tear surgery if:  Your knee is unstable and gives out during routine or athletic activity.  You are a  and your knee could be unstable and give out during your sport (for example, basketball, football, or soccer).  You are not willing to give up sports that involve pivoting and cutting, like soccer and basketball.  You want to prevent further injury to your knee. An unstable knee may lead to more injuries and arthritis.    HOW CAN I MANAGE MENISCAL TEAR?  Follow your healthcare team's instructions, including any recommended physical therapy or exercises.  To keep swelling down and help relieve pain for the first few days after the injury:  Put an ice pack, gel pack, or package of frozen vegetables wrapped in a cloth on the injured area every 3 to 4 hours for up to 20 minutes at a time.  Keep your knee up on a pillow when you sit or lie down.  Take nonprescription pain medicine, such as  acetaminophen, ibuprofen, or naproxen. Read the label and take as directed. Unless recommended by your healthcare team, you should not take this medicine for more than 10 days.    Knee Exercises    You may do the first 7 exercises right away. You may do the rest of the exercises when the pain in your knee has decreased.    Passive knee extension: Do this exercise if you are unable to extend your knee fully. While lying on your back, place a rolled-up towel under the heel of your injured leg so the heel is about 6 inches off the ground. Relax your leg muscles and let gravity slowly straighten your knee. Try to hold this position for 2 minutes. Repeat 3 times. You may feel some discomfort while doing this exercise. Do the exercise several times a day.  This exercise can also be done while sitting in a chair with your heel on another chair or stool.    Heel slide: Sit on a firm surface with your legs straight in front of you. Slowly slide the heel of the foot on your injured side toward your buttock by pulling your knee toward your chest as you slide the heel. Return to the starting position. Do 2 sets of 15.  Standing calf stretch: Stand facing a wall with your hands on the wall at about eye level. Keep your injured leg back with your heel on the floor. Keep the other leg forward with the knee bent. Turn your back foot slightly inward (as if you were pigeon-toed). Slowly lean into the wall until you feel a stretch in the back of your calf. Hold the stretch for 15 to 30 seconds. Return to the starting position. Repeat 3 times. Do this exercise several times each day.    Hamstring stretch on wall: Lie on your back with your buttocks close to a doorway. Stretch your uninjured leg straight out in front of you on the floor through the doorway. Raise your injured leg and rest it against the wall next to the door frame. Keep your leg as straight as possible. You should feel a stretch in the back of your thigh. Hold this  position for 15 to 30 seconds. Repeat 3 times.  Straight leg raise: Lie on your back with your legs straight out in front of you. Bend the knee on your uninjured side and place the foot flat on the floor. Tighten the thigh muscle on your injured side and lift your leg about 8 inches off the floor. Keep your leg straight and your thigh muscle tight. Slowly lower your leg back down to the floor. Do 2 sets of 15.    Prone hip extension: Lie on your stomach with your legs straight out behind you. Fold your arms under your head and rest your head on your arms. Draw your belly button in towards your spine and tighten your abdominal muscles. Tighten the buttocks and thigh muscles of the leg on your injured side and lift the leg off the floor about 8 inches. Keep your leg straight. Hold for 5 seconds. Then lower your leg and relax. Do 2 sets of 15.  Clam exercise: Lie on your uninjured side with your hips and knees bent and feet together. Slowly raise your top leg toward the ceiling while keeping your heels touching each other. Hold for 2 seconds and lower slowly. Do 2 sets of 15 repetitions.    Wall squat with a ball: Stand with your back, shoulders, and head against a wall. Look straight ahead. Keep your shoulders relaxed and your feet 3 feet (90 centimeters) from the wall and shoulder's width apart. Place a soccer or basketball-sized ball behind your back. Keeping your back against the wall, slowly squat down to a 45-degree angle. Your thighs will not yet be parallel to the floor. Hold this position for 10 seconds and then slowly slide back up the wall. Repeat 10 times. Build up to 2 sets of 15.  Step-up: Stand with the foot of your injured leg on a support 3 to 5 inches (8 to 13 centimeters) high --like a small step or block of wood. Keep your other foot flat on the floor. Shift your weight onto the injured leg on the support. Straighten your injured leg as the other leg comes off the floor. Return to the starting  position by bending your injured leg and slowly lowering your uninjured leg back to the floor. Do 2 sets of 15.    Knee stabilization: Wrap a piece of elastic tubing around the ankle of your uninjured leg. Tie a knot in the other end of the tubing and close it in a door at about ankle height.  Stand facing the door on the leg without tubing (your injured leg) and bend your knee slightly, keeping your thigh muscles tight. Stay in this position while you move the leg with the tubing (the uninjured leg) straight back behind you. Do 2 sets of 15.  Turn 90 degrees so the leg without tubing is closest to the door. Move the leg with tubing away from your body. Do 2 sets of 15.  Turn 90 degrees again so your back is to the door. Move the leg with tubing straight out in front of you. Do 2 sets of 15.  Turn your body 90 degrees again so the leg with tubing is closest to the door. Move the leg with tubing across your body. Do 2 sets of 15.  Hold onto a chair if you need help balancing. This exercise can be made more challenging by standing on a firm pillow or foam mat while you move the leg with tubing.    Resisted terminal knee extension: Make a loop with a piece of elastic tubing by tying a knot in both ends. Close the knot in a door at knee height. Step into the loop with your injured leg so the tubing is around the back of your knee. Lift the other foot off the ground and hold onto a chair for balance, if needed. Bend the knee with tubing about 45 degrees. Slowly straighten your leg, keeping your thigh muscle tight as you do this. Repeat 15 times. Do 2 sets of 15. If you need an easier way to do this, stand on both legs for better support while you do the exercise.  If you have access to a wobble board, do the following exercises:            Developed by [employer].  Published by [employer].  Copyright  2014 [employer] and/or one of its subsidiaries. All rights reserved.

## 2023-09-20 NOTE — LETTER
9/20/2023         RE: Cristian Antonio  [address] [facility] [address]        Dear Colleague,    Thank you for referring your patient, Cristian Antonio, to the [facility] [place]. Please see a copy of my visit note below.    CHIEF COMPLAINT:  Pain of the Right Knee       HISTORY OF PRESENT ILLNESS  Mr. Antonio is a pleasant 63 year old year old male who presents to clinic today with right knee pain.  Cristian explains that the knee has been bothering him for the last few years. There isn't a lot of pain but the knee feels unstable when walking on flat surfaces. There was no injury or trauma specifically to the area but he did play football.     Onset: gradual  Location: right knee  Quality:  No pain, just instability   Duration: 2 years   Severity: 0/10 at worst  Timing: intermittent episodes with walking but walking up and down stairs does not. Standing up from sitting for long periods of time.   Modifying factors:  resting and non-use makes it better, movement and use makes it worse  Associated signs & symptoms: instability   Previous similar pain: No  Treatments to date: None    Additional history: as documented    Review of Systems:  Have you recently had a a fever, chills, weight loss? No  Do you have any vision problems? No  Do you have any chest pain or edema? No  Do you have any shortness of breath or wheezing?  No  Do you have stomach problems? No  Do you have any numbness or focal weakness? No  Do you have diabetes? No  Do you have problems with bleeding or clotting? No  Do you have an rashes or other skin lesions? No    MEDICAL HISTORY  Patient Active Problem List   Diagnosis     CARDIOVASCULAR SCREENING; LDL GOAL LESS THAN 160     Deaf     Benign essential hypertension     SANDHYA (acute kidney injury) (H)     Vomiting and diarrhea       Current Outpatient Medications   Medication Sig Dispense Refill     acetaminophen (TYLENOL) 650 MG CR tablet Take 650 mg by mouth every 8  "hours as needed for mild pain or fever       amLODIPine (NORVASC) 5 MG tablet Take 1 tablet (5 mg) by mouth daily 90 tablet 1       Allergies   Allergen Reactions     Banana Nausea     Ibuprofen Other (See Comments)     Uncontrollable shaking for approx. 20 minutes - almost \"seizure-like\"       Family History   Problem Relation Age of Onset     Diabetes No family hx of      Cancer - colorectal No family hx of        Additional medical/Social/Surgical histories reviewed in [facility] and updated as appropriate.       PHYSICAL EXAM  There were no vitals taken for this visit.    General  - normal appearance, in no obvious distress  Musculoskeletal - Right  - stance: normal gait without limp  - inspection: no swelling or effusion, normal bone and joint alignment, no obvious deformity  - palpation: no joint line tenderness, patella and patellar tendon non-tender  - ROM: 120 degrees flexion, 0 degrees extension, not painful, normal actively and passively compared to contralateral  - strength: 5/5 in flexion, 5/5 in extension  - special tests:  (-) Lachman  (-) Bharath  (-) varus at 0 and 30 degrees flexion  (-) valgus at 0 and 30 degrees flexion  Neuro  - no sensory or motor deficit, grossly normal coordination, normal muscle tone  Skin  - no ecchymosis, erythema, warmth, or induration, no obvious rash      IMAGING : XR right knee 3 views. Final results and radiologist's interpretation, available in the [facility] health record. Images were reviewed with the patient/family members in the office today. My personal interpretation of the performed imaging is at least moderate narrowing of medial compartment, osteophyte medial compartment of knee.     ASSESSMENT & PLAN  Mr. Antonio is a 63 year old year old male who presents to clinic today with chronic right knee instability and mild pain with ambulation.    Discussed osteoarthritic changes of knee, suspected inhibition    Diagnosis: Primary osteoarthritis of right knee, Instability of " right knee    -Discussed PT as cornerstone of treatment for weakness and instability as it relates to DJD  -Corticosteroid injection discussed as adjunct treatment, he wishes to proceed to day. I believe this will assist in improving quadriceps inhibition that is driving some of his instability  -Follow up 3 mos    PROCEDURE    Right Knee Injection - Intraarticular  The patient was informed of the risks and the benefits of the procedure and a written consent was signed.  The patient s right knee was prepped with chlorhexidine in sterile fashion.   40 mg of triamcinolone suspension was drawn up into a 5 mL syringe with 4 mL of 1% lidocaine.  Injection was performed using substerile technique.  A 1.5-inch 22-gauge needle was used to enter the lateral aspect of the right knee.  Injection performed successfully without difficulty.  There were no complications. The patient tolerated the procedure well. There was negligible bleeding.   The patient was instructed to ice the knee upon leaving clinic and refrain from overuse over the next 3 days.   The patient was instructed to call or go to the emergency room with any unusual pain, swelling, redness, or if otherwise concerned.  A follow up appointment will be scheduled to evaluate response to the injection, and to assess range of motion and pain.      It was a pleasure seeing Cristian today.   utilized for duration of this visit.    Austen Walton DO, CAQSM  Primary Care Sports Medicine    Large Joint Injection/Arthocentesis: R knee joint    Date/Time: 9/20/2023 10:36 AM    Performed by: Austen Walton DO  Authorized by: Austen Walton DO    Indications:  Pain  Needle Size:  22 G  Guidance: landmark guided    Approach:  Anterolateral  Location:  Knee      Medications:  40 mg triamcinolone 40 MG/ML; 4 mL lidocaine 1 %  Outcome:  Tolerated well, no immediate complications  Procedure discussed: discussed risks, benefits, and alternatives    Consent Given by:   Patient  Timeout: timeout called immediately prior to procedure    Prep: patient was prepped and draped in usual sterile fashion          Again, thank you for allowing me to participate in the care of your patient.        Sincerely,        Austen Walton, DO

## 2023-09-20 NOTE — PROGRESS NOTES
CHIEF COMPLAINT:  Pain of the Right Knee       HISTORY OF PRESENT ILLNESS  Mr. Antonio is a pleasant 63 year old year old male who presents to clinic today with right knee pain.  Cristian explains that the knee has been bothering him for the last few years. There isn't a lot of pain but the knee feels unstable when walking on flat surfaces. There was no injury or trauma specifically to the area but he did play football.     Onset: gradual  Location: right knee  Quality:  No pain, just instability   Duration: 2 years   Severity: 0/10 at worst  Timing: intermittent episodes with walking but walking up and down stairs does not. Standing up from sitting for long periods of time.   Modifying factors:  resting and non-use makes it better, movement and use makes it worse  Associated signs & symptoms: instability   Previous similar pain: No  Treatments to date: None    Additional history: as documented    Review of Systems:  Have you recently had a a fever, chills, weight loss? No  Do you have any vision problems? No  Do you have any chest pain or edema? No  Do you have any shortness of breath or wheezing?  No  Do you have stomach problems? No  Do you have any numbness or focal weakness? No  Do you have diabetes? No  Do you have problems with bleeding or clotting? No  Do you have an rashes or other skin lesions? No    MEDICAL HISTORY  Patient Active Problem List   Diagnosis    CARDIOVASCULAR SCREENING; LDL GOAL LESS THAN 160    Deaf    Benign essential hypertension    SANDHYA (acute kidney injury) (H)    Vomiting and diarrhea       Current Outpatient Medications   Medication Sig Dispense Refill    acetaminophen (TYLENOL) 650 MG CR tablet Take 650 mg by mouth every 8 hours as needed for mild pain or fever      amLODIPine (NORVASC) 5 MG tablet Take 1 tablet (5 mg) by mouth daily 90 tablet 1       Allergies   Allergen Reactions    Banana Nausea    Ibuprofen Other (See Comments)     Uncontrollable shaking for approx. 20 minutes - almost  "\"seizure-like\"       Family History   Problem Relation Age of Onset    Diabetes No family hx of     Cancer - colorectal No family hx of        Additional medical/Social/Surgical histories reviewed in [facility] and updated as appropriate.       PHYSICAL EXAM  There were no vitals taken for this visit.    General  - normal appearance, in no obvious distress  Musculoskeletal - Right  - stance: normal gait without limp  - inspection: no swelling or effusion, normal bone and joint alignment, no obvious deformity  - palpation: no joint line tenderness, patella and patellar tendon non-tender  - ROM: 120 degrees flexion, 0 degrees extension, not painful, normal actively and passively compared to contralateral  - strength: 5/5 in flexion, 5/5 in extension  - special tests:  (-) Lachman  (-) Bharath  (-) varus at 0 and 30 degrees flexion  (-) valgus at 0 and 30 degrees flexion  Neuro  - no sensory or motor deficit, grossly normal coordination, normal muscle tone  Skin  - no ecchymosis, erythema, warmth, or induration, no obvious rash      IMAGING : XR right knee 3 views. Final results and radiologist's interpretation, available in the [facility] health record. Images were reviewed with the patient/family members in the office today. My personal interpretation of the performed imaging is at least moderate narrowing of medial compartment, osteophyte medial compartment of knee.     ASSESSMENT & PLAN  Mr. Antonio is a 63 year old year old male who presents to clinic today with chronic right knee instability and mild pain with ambulation.    Discussed osteoarthritic changes of knee, suspected inhibition    Diagnosis: Primary osteoarthritis of right knee, Instability of right knee    -Discussed PT as cornerstone of treatment for weakness and instability as it relates to DJD  -Corticosteroid injection discussed as adjunct treatment, he wishes to proceed to day. I believe this will assist in improving quadriceps inhibition that is driving some " of his instability  -Follow up 3 mos    PROCEDURE    Right Knee Injection - Intraarticular  The patient was informed of the risks and the benefits of the procedure and a written consent was signed.  The patient s right knee was prepped with chlorhexidine in sterile fashion.   40 mg of triamcinolone suspension was drawn up into a 5 mL syringe with 4 mL of 1% lidocaine.  Injection was performed using substerile technique.  A 1.5-inch 22-gauge needle was used to enter the lateral aspect of the right knee.  Injection performed successfully without difficulty.  There were no complications. The patient tolerated the procedure well. There was negligible bleeding.   The patient was instructed to ice the knee upon leaving clinic and refrain from overuse over the next 3 days.   The patient was instructed to call or go to the emergency room with any unusual pain, swelling, redness, or if otherwise concerned.  A follow up appointment will be scheduled to evaluate response to the injection, and to assess range of motion and pain.      It was a pleasure seeing Cristian today.   utilized for duration of this visit.    Austen Walton DO, [facility]  Primary Care Sports Medicine    Large Joint Injection/Arthocentesis: R knee joint    Date/Time: 9/20/2023 10:36 AM    Performed by: Austen Walton DO  Authorized by: Austen Walton DO    Indications:  Pain  Needle Size:  22 G  Guidance: landmark guided    Approach:  Anterolateral  Location:  Knee      Medications:  40 mg triamcinolone 40 MG/ML; 4 mL lidocaine 1 %  Outcome:  Tolerated well, no immediate complications  Procedure discussed: discussed risks, benefits, and alternatives    Consent Given by:  Patient  Timeout: timeout called immediately prior to procedure    Prep: patient was prepped and draped in usual sterile fashion

## 2023-10-05 ENCOUNTER — OFFICE VISIT (OUTPATIENT)
Dept: ORTHOPEDICS | Facility: CLINIC | Age: 63
End: 2023-10-05
Payer: COMMERCIAL

## 2023-10-05 VITALS
DIASTOLIC BLOOD PRESSURE: 100 MMHG | SYSTOLIC BLOOD PRESSURE: 166 MMHG | WEIGHT: 242 LBS | BODY MASS INDEX: 29.47 KG/M2 | HEIGHT: 76 IN

## 2023-10-05 DIAGNOSIS — M25.361 INSTABILITY OF RIGHT KNEE JOINT: ICD-10-CM

## 2023-10-05 DIAGNOSIS — M17.11 PRIMARY OSTEOARTHRITIS OF RIGHT KNEE: Primary | ICD-10-CM

## 2023-10-05 PROCEDURE — 99213 OFFICE O/P EST LOW 20 MIN: CPT | Performed by: FAMILY MEDICINE

## 2023-10-05 NOTE — LETTER
10/5/2023         RE: Cristian Antonio  [address] [facility] [address]        Dear Colleague,    Thank you for referring your patient, Cristian Antonio, to the [facility] [place]. Please see a copy of my visit note below.    ESTABLISHED PATIENT FOLLOW-UP:  No chief complaint on file.       HISTORY OF PRESENT ILLNESS  Mr. Antonio is a pleasant 63 year old year old male who presents to clinic today for follow-up of right knee pain.    Evaluated on 9/20/23 Knee is painful, feeling unstable and diagnosed with primary osteoarthritis of right knee.    Date of injury: gradual onset  Date last seen: 9/20/23  Following Therapeutic Plan: Yes, CSI 9/20/23  Pain: Unchanged  Function: Unchanged  Interval History: Difficulty with working.  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     Additional medical/Social/Surgical histories reviewed in [facility] and updated as appropriate.    REVIEW OF SYSTEMS (10/5/2023)  CONSTITUTIONAL: Denies fever and weight loss  GASTROINTESTINAL: Denies abdominal pain, nausea, vomiting  MUSCULOSKELETAL: See HPI  SKIN: Denies any recent rash or lesion  NEUROLOGICAL: Denies numbness or focal weakness     PHYSICAL EXAM  There were no vitals taken for this visit.    General  - normal appearance, in no obvious distress  Musculoskeletal - Right KNEE  - stance: normal gait without limp  - inspection: no swelling or effusion, normal bone and joint alignment, no obvious deformity  - palpation: Tender medial joint line.  - ROM: 120 degrees flexion, 0 degrees extension, not painful, normal actively and passively compared to contralateral  - strength: 5/5 in flexion, 5/5 in extension  - special tests:  (-) Lachman  (-) Bharath  (-) varus at 0 and 30 degrees flexion  (-) valgus at 0 and 30 degrees flexion  Neuro  - no sensory or motor deficit, grossly normal coordination, normal muscle  tone  Skin  - no ecchymosis, erythema, warmth, or induration, no obvious rash    IMAGING :   XR KNEE RIGHT 3 VIEWS 7/17/2023 10:59 AM      HISTORY: right knee pain x3 months; Chronic pain of right knee;  Chronic pain of right knee     COMPARISON: None.                                                                       IMPRESSION: Degenerative narrowing medial compartment of the right  knee. No evidence for acute fracture. No significant effusion.     ANGELIQUE CHI MD      ASSESSMENT & PLAN  Mr. Antonio is a 63 year old year old male who presents to clinic today with ongoing right knee pain despite treatments today.  CSI on 9/20/2023.    X-rays again reviewed today and reveal moderately severe medial joint space narrowing.  Degenerative changes at the patellofemoral joint as well.  No improvement with treatment thus far.    Diagnosis:  Primary arthritis of right knee  Instability of right knee joint    Additional treatment options discussed at this time we will pursue  brace trial, PT for strengthening of his right knee.  If  brace is helping, we will order this through orthotics and prosthetics.  Additionally we discussed Synvisc as an option for hyaluronic acid injections.  This will be ordered and PA will be attempted.  Lastly we discussed briefly total knee arthroplasty consultation is a consideration in the future.  He and his partner would like to exhaust all options prior to pursuing this.  A note was provided allowing him 2 weeks away from his work to rest as he has a fairly demanding/physical job.  It was a pleasure seeing Cristian.     present in the room today.  Cristian's partner is also present in the room today.    Austen Walton DO, [facility]  Primary Care Sports Medicine         Again, thank you for allowing me to participate in the care of your patient.        Sincerely,        Austen Walton DO

## 2023-10-05 NOTE — LETTER
October 5, 2023      Cristian Antonio  [address] [facility] [address]        To Whom It May Concern:    Cristian Antonio was seen in our clinic.  Due to orthopedic related condition of his right knee, please excuse him from work over the next 2 weeks until 10/23/2023.  During this time he will undergo multiple treatment modalities to improve his condition.  His current condition is incompatible with his job description at the [employer].  Thank you for understanding this matter.  If there are additional items/paperwork to complete you may fax them at [phone].    Sincerely,        Austen Walton, DO

## 2023-10-05 NOTE — PROGRESS NOTES
ESTABLISHED PATIENT FOLLOW-UP:  No chief complaint on file.       HISTORY OF PRESENT ILLNESS  Mr. Antonio is a pleasant 63 year old year old male who presents to clinic today for follow-up of right knee pain.    Evaluated on 9/20/23 Knee is painful, feeling unstable and diagnosed with primary osteoarthritis of right knee.    Date of injury: gradual onset  Date last seen: 9/20/23  Following Therapeutic Plan: Yes, CSI 9/20/23  Pain: Unchanged  Function: Unchanged  Interval History: Difficulty with working.  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     Additional medical/Social/Surgical histories reviewed in [facility] and updated as appropriate.    REVIEW OF SYSTEMS (10/5/2023)  CONSTITUTIONAL: Denies fever and weight loss  GASTROINTESTINAL: Denies abdominal pain, nausea, vomiting  MUSCULOSKELETAL: See HPI  SKIN: Denies any recent rash or lesion  NEUROLOGICAL: Denies numbness or focal weakness     PHYSICAL EXAM  There were no vitals taken for this visit.    General  - normal appearance, in no obvious distress  Musculoskeletal - Right KNEE  - stance: normal gait without limp  - inspection: no swelling or effusion, normal bone and joint alignment, no obvious deformity  - palpation: Tender medial joint line.  - ROM: 120 degrees flexion, 0 degrees extension, not painful, normal actively and passively compared to contralateral  - strength: 5/5 in flexion, 5/5 in extension  - special tests:  (-) Lachman  (-) Bharath  (-) varus at 0 and 30 degrees flexion  (-) valgus at 0 and 30 degrees flexion  Neuro  - no sensory or motor deficit, grossly normal coordination, normal muscle tone  Skin  - no ecchymosis, erythema, warmth, or induration, no obvious rash    IMAGING :   XR KNEE RIGHT 3 VIEWS 7/17/2023 10:59 AM      HISTORY: right knee pain x3 months; Chronic pain of right knee;  Chronic pain of right knee     COMPARISON: None.                                                                        IMPRESSION: Degenerative narrowing medial compartment of the right  knee. No evidence for acute fracture. No significant effusion.     ANGELIQUE CHI MD      ASSESSMENT & PLAN  Mr. Antonio is a 63 year old year old male who presents to clinic today with ongoing right knee pain despite treatments today.  CSI on 9/20/2023.    X-rays again reviewed today and reveal moderately severe medial joint space narrowing.  Degenerative changes at the patellofemoral joint as well.  No improvement with treatment thus far.    Diagnosis:  Primary arthritis of right knee  Instability of right knee joint    Additional treatment options discussed at this time we will pursue  brace trial, PT for strengthening of his right knee.  If  brace is helping, we will order this through orthotics and prosthetics.  Additionally we discussed Synvisc as an option for hyaluronic acid injections.  This will be ordered and PA will be attempted.  Lastly we discussed briefly total knee arthroplasty consultation is a consideration in the future.  He and his partner would like to exhaust all options prior to pursuing this.  A note was provided allowing him 2 weeks away from his work to rest as he has a fairly demanding/physical job.  It was a pleasure seeing Cristian.     present in the room today.  Cristian's partner is also present in the room today.    Austen Walton DO, CAM  Primary Care Sports Medicine

## 2023-10-05 NOTE — PATIENT INSTRUCTIONS
Thank you for choosing [facility]    [facility] CLIN[facility] LOCATIONS  [place] [address] [address] [facility], [address]th [place]   [place], [address] [place] [address]   [phone]       APPOINTMENTS: [phone]    CARE QUESTIONS: [phone],    BILLING QUESTIONS: [phone]    FAX NUMBER: [phone]        Follow up: 2 weeks for injection appointment      1. Primary osteoarthritis of right knee    2. Instability of right knee joint          Physical Therapy orders have been placed with [facility] (formally [facility])  You can call [phone] to schedule at your convenience.

## 2023-10-12 ENCOUNTER — THERAPY VISIT (OUTPATIENT)
Dept: PHYSICAL THERAPY | Facility: CLINIC | Age: 63
End: 2023-10-12
Attending: FAMILY MEDICINE
Payer: COMMERCIAL

## 2023-10-12 ENCOUNTER — TELEPHONE (OUTPATIENT)
Dept: FAMILY MEDICINE | Facility: CLINIC | Age: 63
End: 2023-10-12

## 2023-10-12 ENCOUNTER — DOCUMENTATION ONLY (OUTPATIENT)
Dept: ORTHOPEDICS | Facility: CLINIC | Age: 63
End: 2023-10-12

## 2023-10-12 DIAGNOSIS — M17.11 PRIMARY OSTEOARTHRITIS OF RIGHT KNEE: Primary | ICD-10-CM

## 2023-10-12 DIAGNOSIS — M25.361 INSTABILITY OF RIGHT KNEE JOINT: ICD-10-CM

## 2023-10-12 DIAGNOSIS — M17.11 PRIMARY OSTEOARTHRITIS OF RIGHT KNEE: ICD-10-CM

## 2023-10-12 PROCEDURE — T1013 SIGN LANG/ORAL INTERPRETER: HCPCS | Mod: U3 | Performed by: PHYSICAL THERAPIST

## 2023-10-12 PROCEDURE — 97530 THERAPEUTIC ACTIVITIES: CPT | Mod: GP | Performed by: PHYSICAL THERAPIST

## 2023-10-12 PROCEDURE — 97161 PT EVAL LOW COMPLEX 20 MIN: CPT | Mod: GP | Performed by: PHYSICAL THERAPIST

## 2023-10-12 NOTE — LETTER
October 12, 2023      Cristian Antonio  [address] [facility] [address]        Dear Cristian,    I care about your health and have reviewed your health plan. I have reviewed your medical conditions, medication list, and lab results and am making recommendations based on this review, to better manage your health.    You are in particular need of attention regarding:  -High Blood Pressure  -Colon Cancer Screening  -Wellness (Physical) Visit   -Vaccinations    I am recommending that you:  -schedule a WELLNESS (Physical) APPOINTMENT with me.   I will check fasting labs the same day - nothing to eat except water and meds for 8-10 hours prior.    Here is a list of Health Maintenance topics that are due now or due soon:  Health Maintenance Due   Topic Date Due    Discuss Advance Care Planning  Never done    Colorectal Cancer Screening  Never done    HIV Screening  Never done    Hepatitis C Screening  Never done    Zoster (Shingles) Vaccine (1 of 2) Never done    Yearly Preventive Visit  08/27/2015    RSV VACCINE 60+ (1 - 1-dose 60+ series) Never done    Cholesterol Lab  06/11/2023    Flu Vaccine (1) 09/01/2023    COVID-19 Vaccine (4 - 2023-24 season) 09/01/2023       Please call us at [phone] (or use [employer]) to address the above recommendations.     Thank you for trusting [facility] and we appreciate the opportunity to serve you.  We look forward to supporting your healthcare needs in the future.    Healthy Regards,    Marianna Gil PA-C

## 2023-10-12 NOTE — TELEPHONE ENCOUNTER
Patient Quality Outreach    Patient is due for the following:   Hypertension -  BP check  Colon Cancer Screening  Physical Preventive Adult Physical      Topic Date Due    Zoster (Shingles) Vaccine (1 of 2) Never done    Flu Vaccine (1) 09/01/2023    COVID-19 Vaccine (4 - 2023-24 season) 09/01/2023       Next Steps:   Schedule a Well Child Check    Type of outreach:    Sent letter.      Questions for provider review:    None           Chantale Marmolejo MA

## 2023-10-12 NOTE — PROGRESS NOTES
"PHYSICAL THERAPY EVALUATION  Type of Visit: Evaluation    See electronic medical record for Abuse and Falls Screening details.    Subjective       Presenting condition or subjective complaint: Right knee med OA;  brace trial. Walking pain, running hurts more  Date of onset: 10/05/23    Relevant medical history:     Dates & types of surgery:      Prior diagnostic imaging/testing results: X-ray     Prior therapy history for the same diagnosis, illness or injury: No        Employment:   [employer]  Hobbies/Interests: Football, basketball, softball, racquetball    Patient goals for therapy: Be more active without pain    Pain assessment: See objective evaluation for additional pain details     Objective   KNEE EVALUATION  PAIN:   Pain Level with Use: 610  Pain Location: knee and Med joint line  Pain Quality: Aching and Sharp  Pain is Exacerbated By: Running, Jumping, going downstairs  Pain is Relieved By: Tylenol, Ice  INTEGUMENTARY (edema, incisions):  diffuse R LE edema; - brush test    GAIT:  Weightbearing Status: WBAT  Assistive Device(s): None  Gait Deviations:    Knee extension decreased R  Trendelenberg L, Trendelenberg R    ROM:  B knee flex 120; end range pain on R flexion    STRENGTH:  good QS B; 4/5 glute med B  SPECIAL TESTS:  Trace ant drawer AP instability R, Trace valgus laxity R  FUNCTIONAL TESTS: Double Leg Squat: Anterior knee translation and Impaired weight distribution  PALPATION:  Ttp R med joint line    Ossur Knee  Brace Trial:     Pain Rating    Affected Joint: Right Knee    Without  brace:  At rest 0/10  Walking: 3/10    Squattin/10   Stairs: 4/10 (going downstairs)     Wearing  brace:    At rest 0/10  Walkin/10    Squattin/10   Stairs: 0/10      Brace and Measurements:    Brace trialed:     Type - Ossur  One  Size - Large  Side - Medial  Affected Knee - Right Knee    Circumference 6 inches below mid patella: 18\" inches (Ossur  One/X) " "and Circumference 6 inches above mid patella: 20.5\" inches (Ossur OA Ease Knee )    Other Comments:  See PT evaluation in Epic for any additional information including joint special testing/ligament testing      Assessment & Plan   CLINICAL IMPRESSIONS  Medical Diagnosis: R med compartment knee OA    Treatment Diagnosis: R knee OA-  brace trial   Impression/Assessment: Patient is a 63 year old male with R knee Med OA complaints.  The following significant findings have been identified: Pain, Decreased joint mobility, Decreased strength, Impaired gait, and Decreased activity tolerance. These impairments interfere with their ability to perform recreational activities, household mobility, and community mobility as compared to previous level of function.     Clinical Decision Making (Complexity):  Clinical Presentation: Stable/Uncomplicated  Clinical Presentation Rationale: based on medical and personal factors listed in PT evaluation  Clinical Decision Making (Complexity): Low complexity    PLAN OF CARE  Treatment Interventions:  Interventions: Gait Training, Manual Therapy, Therapeutic Activity, Therapeutic Exercise, Self-Care/Home Management, Orthotic Fitting/Training    Long Term Goals            Frequency of Treatment: wkly to bi-monthly  Duration of Treatment: 8 wks    Recommended Referrals to Other Professionals: Physical Therapy  Education Assessment:        Risks and benefits of evaluation/treatment have been explained.   Patient/Family/caregiver agrees with Plan of Care.     Evaluation Time:     PT Eval, Low Complexity Minutes (28186): 25   Present: Yes: Language: ASL, ID Number/Identifier: [id number]     Signing Clinician: Janene Chapman, PT          "

## 2023-10-18 ENCOUNTER — TELEPHONE (OUTPATIENT)
Dept: ORTHOPEDICS | Facility: CLINIC | Age: 63
End: 2023-10-18
Payer: COMMERCIAL

## 2023-10-18 DIAGNOSIS — M17.11 PRIMARY OSTEOARTHRITIS OF RIGHT KNEE: Primary | ICD-10-CM

## 2023-10-18 NOTE — TELEPHONE ENCOUNTER
Patient needs a new PA submitted, insurance doesn't cover Synvisc. PA for Euflexxa, Monovisc, or Orthovisc needed.    Routing to provider to sign.    Slime Mclaughlin, SIMA, LAT, ATC  Certified Athletic Trainer

## 2023-10-19 NOTE — TELEPHONE ENCOUNTER
Patient was denied for any HA injection and denial letter in media tab of their chart.    Main reasons for denial:  Patient needs to complete a round of PT  Patient needs to undergo 2 corticosteroid injections prior to being approved for an HA injection    Called patient to let them know. The phone call went to answering machine and an message was left via video  services. Patient was informed that insurance denied the injection for today. He will need to continue his PT, initial appointment was on 10/12, and he may schedule a follow up in a few months to re-evaluate. Central scheduling number was given for him to call and reschedule.     Slime Mclaughlin, SIMA, LAT, ATC  Certified Athletic Trainer

## 2023-10-26 ENCOUNTER — TELEPHONE (OUTPATIENT)
Dept: PHYSICAL THERAPY | Facility: CLINIC | Age: 63
End: 2023-10-26
Payer: COMMERCIAL

## 2023-10-26 DIAGNOSIS — M17.11 PRIMARY OSTEOARTHRITIS OF RIGHT KNEE: Primary | ICD-10-CM

## 2023-10-26 DIAGNOSIS — M25.361 INSTABILITY OF RIGHT KNEE JOINT: ICD-10-CM

## 2023-11-29 ENCOUNTER — TELEPHONE (OUTPATIENT)
Dept: PHYSICAL THERAPY | Facility: CLINIC | Age: 63
End: 2023-11-29
Payer: COMMERCIAL

## 2024-06-07 NOTE — ANESTHESIA CARE TRANSFER NOTE
Patient: Cristian Antonio    Procedure: Procedure(s):  LAPAROSCOPIC CHOLECYSTECTOMY       Diagnosis: Acute cholecystitis [K81.0]  Diagnosis Additional Information: No value filed.    Anesthesia Type:   General     Note:    Oropharynx: oropharynx clear of all foreign objects and spontaneously breathing  Level of Consciousness: drowsy  Oxygen Supplementation: face mask  Level of Supplemental Oxygen (L/min / FiO2): 8  Independent Airway: airway patency satisfactory and stable  Dentition: dentition unchanged  Vital Signs Stable: post-procedure vital signs reviewed and stable  Report to RN Given: handoff report given  Patient transferred to: PACU    Handoff Report: Identifed the Patient, Identified the Reponsible Provider, Reviewed the pertinent medical history, Discussed the surgical course, Reviewed Intra-OP anesthesia mangement and issues during anesthesia, Set expectations for post-procedure period and Allowed opportunity for questions and acknowledgement of understanding      Vitals:  Vitals Value Taken Time   /90 04/19/23 1220   Temp     Pulse 86 04/19/23 1221   Resp 18 04/19/23 1221   SpO2 96 % 04/19/23 1221   Vitals shown include unvalidated device data.    Electronically Signed By: LESLEE Sim CRNA  April 19, 2023  12:22 PM   Patient refuses to wash up as his chest tube is causing him too much pain right now, stating reason too uncomfortable. Patient verbalized understanding of teaching. P refusal despite education. Writer will continue to reinforce teaching.

## 2024-08-15 PROBLEM — M25.361 INSTABILITY OF RIGHT KNEE JOINT: Status: RESOLVED | Noted: 2023-10-12 | Resolved: 2024-08-15

## 2024-08-15 PROBLEM — M17.11 PRIMARY OSTEOARTHRITIS OF RIGHT KNEE: Status: RESOLVED | Noted: 2023-10-12 | Resolved: 2024-08-15

## 2024-11-13 ENCOUNTER — TELEPHONE (OUTPATIENT)
Dept: FAMILY MEDICINE | Facility: CLINIC | Age: 64
End: 2024-11-13
Payer: COMMERCIAL

## 2024-11-13 NOTE — TELEPHONE ENCOUNTER
Patient Quality Outreach    Patient is due for the following:   Colon Cancer Screening  Physical Annual Wellness Visit    Action(s) Taken:   Patient has upcoming appointment, these items will be addressed at that time.    Type of outreach:    Chart review performed, no outreach needed.    Questions for provider review:    None           Dali Denny, [facility]

## 2025-01-13 ENCOUNTER — DOCUMENTATION ONLY (OUTPATIENT)
Dept: FAMILY MEDICINE | Facility: CLINIC | Age: 65
End: 2025-01-13
Payer: COMMERCIAL

## 2025-01-13 NOTE — PROGRESS NOTES
I have not seen this patient since 7/2023    I would not recommend getting labs done until he can be seen in clinic to discuss and ensure we are ordering appropriate labs.     Please contact patient to cancel lab appointment and schedule follow up appointment in clinic. Please also confirm - is he taking his amlodipine for his BP? I want to make sure he has been taking this when I see him so we can assess if it's been effective or not.  Marianna Colin PA-C on 1/13/2025 at 8:58 AM

## 2025-01-13 NOTE — PROGRESS NOTES
Cristian Antonio has an upcoming lab appointment:    Future Appointments   Date Time Provider Department Center   1/18/2025 10:00 AM [facility]     Patient is scheduled for the following lab(s):   Scheduling Notes: CR genoveva   Made On:  Changed:  Change Notes: 1/10/2025 1:21 PM  1/13/2025 8:03 AM  1/13/2025 8:03 AM By:  By:  By: ROSA HESTER CARRISSA SCHAUER, CARRISSA       There is no order available. Please review and place either future orders or HMPO (Review of Health Maintenance Protocol Orders), as appropriate.    Health Maintenance Due   Topic    HIV SCREENING     HEPATITIS C SCREENING     LIPID     BMP     ANNUAL REVIEW OF HM ORDERS      Ethan Burks

## 2025-01-14 NOTE — TELEPHONE ENCOUNTER
Left voicemail for patient to call back to schedule appointment with Marianna and letting them know I am canceling the lab appointment

## 2025-03-13 ENCOUNTER — OFFICE VISIT (OUTPATIENT)
Dept: INTERNAL MEDICINE | Facility: CLINIC | Age: 65
End: 2025-03-13
Payer: COMMERCIAL

## 2025-03-13 VITALS
WEIGHT: 239.1 LBS | HEIGHT: 76 IN | OXYGEN SATURATION: 98 % | BODY MASS INDEX: 29.11 KG/M2 | SYSTOLIC BLOOD PRESSURE: 180 MMHG | DIASTOLIC BLOOD PRESSURE: 116 MMHG | HEART RATE: 60 BPM | RESPIRATION RATE: 16 BRPM

## 2025-03-13 DIAGNOSIS — Z13.220 ENCOUNTER FOR SCREENING FOR LIPID DISORDER: ICD-10-CM

## 2025-03-13 DIAGNOSIS — Z12.5 SCREENING FOR PROSTATE CANCER: ICD-10-CM

## 2025-03-13 DIAGNOSIS — K22.2 ESOPHAGEAL STRICTURE: ICD-10-CM

## 2025-03-13 DIAGNOSIS — I10 ESSENTIAL HYPERTENSION: ICD-10-CM

## 2025-03-13 DIAGNOSIS — Z12.11 SCREEN FOR COLON CANCER: ICD-10-CM

## 2025-03-13 DIAGNOSIS — Z00.00 ROUTINE GENERAL MEDICAL EXAMINATION AT A HEALTH CARE FACILITY: Primary | ICD-10-CM

## 2025-03-13 LAB
BASOPHILS # BLD AUTO: 0 10E3/UL (ref 0–0.2)
BASOPHILS NFR BLD AUTO: 0 %
EOSINOPHIL # BLD AUTO: 0.1 10E3/UL (ref 0–0.7)
EOSINOPHIL NFR BLD AUTO: 2 %
ERYTHROCYTE [DISTWIDTH] IN BLOOD BY AUTOMATED COUNT: 12.4 % (ref 10–15)
HCT VFR BLD AUTO: 47.8 % (ref 40–53)
HGB BLD-MCNC: 17 G/DL (ref 13.3–17.7)
IMM GRANULOCYTES # BLD: 0 10E3/UL
IMM GRANULOCYTES NFR BLD: 0 %
LYMPHOCYTES # BLD AUTO: 0.9 10E3/UL (ref 0.8–5.3)
LYMPHOCYTES NFR BLD AUTO: 17 %
MCH RBC QN AUTO: 29.6 PG (ref 26.5–33)
MCHC RBC AUTO-ENTMCNC: 35.6 G/DL (ref 31.5–36.5)
MCV RBC AUTO: 83 FL (ref 78–100)
MONOCYTES # BLD AUTO: 0.5 10E3/UL (ref 0–1.3)
MONOCYTES NFR BLD AUTO: 10 %
NEUTROPHILS # BLD AUTO: 3.5 10E3/UL (ref 1.6–8.3)
NEUTROPHILS NFR BLD AUTO: 70 %
PLATELET # BLD AUTO: 181 10E3/UL (ref 150–450)
RBC # BLD AUTO: 5.74 10E6/UL (ref 4.4–5.9)
WBC # BLD AUTO: 5 10E3/UL (ref 4–11)

## 2025-03-13 RX ORDER — AMLODIPINE AND VALSARTAN 5; 160 MG/1; MG/1
1 TABLET ORAL DAILY
Qty: 30 TABLET | Refills: 0 | Status: SHIPPED | OUTPATIENT
Start: 2025-03-13

## 2025-03-13 SDOH — HEALTH STABILITY: PHYSICAL HEALTH: ON AVERAGE, HOW MANY DAYS PER WEEK DO YOU ENGAGE IN MODERATE TO STRENUOUS EXERCISE (LIKE A BRISK WALK)?: 3 DAYS

## 2025-03-13 ASSESSMENT — SOCIAL DETERMINANTS OF HEALTH (SDOH): HOW OFTEN DO YOU GET TOGETHER WITH FRIENDS OR RELATIVES?: TWICE A WEEK

## 2025-03-13 NOTE — PATIENT INSTRUCTIONS
- Our team will contact you via Register My InfoÂ® (if you sign up for it) or otherwise via letter in the mail with the results of today's lab tests once I have a chance to review them    - Get a colonoscopy and repeat EGD! A referral has been placed.    - Make an appointment with our pharmacy downstairs or stop by your preferred pharmacy to discuss obtaining the Shingrix (shingles) vaccine series    Today we discussed your hypertension (high blood pressure). Goal blood pressure is an average of less than 130/80. Four important things to remember about your hypertension:    1) Take all medications as prescribed! Today we discussed your blood pressure medications and decided to start a new medication called valsartan-amlodipine.    2) Lose weight! Losing weight is the best thing you can do to lower your blood pressure. Studies have found that for every 2 pounds you lose, your blood pressure will go down by 1 point. Ex: if you lose 10 pounds, your blood pressure should go down by 5 points. That's better than any medication, plus it will impact your health in a number of other positive ways. This may be a good time to make a weight loss goal.    3) Lower your sodium intake! Eating too much salt causes your body to hold onto extra water, which makes your blood pressure higher. Ditching the salt shaker is a good thing, but most of our sodium intake actually comes from pre-packaged foods. Read labels on cans and food packages. The labels tell you how much sodium is in each serving. Make sure that you look at the serving size. If you eat more than the serving size, you have eaten more sodium. Decreasing your sodium intake by more than 1,000mg per day can lower your blood pressure by up to 5 points and can be as effective as starting a blood pressure medication.    4) Check your blood pressure at home! You can buy a home monitor in a drugstore, supermarket pharmacy, or other large store. Not all automated blood pressure machines are  created equal. You can find a list of validated blood pressure cuffs (meaning they have been confirmed to give accurate readings) by going to www.validatebp.org. That website does not sell blood pressure cuffs, but rather it lists the exact models that have been validated to be accurate. Wrist blood pressure cuffs do not provide reliable comparisons to upper arm (brachial) cuffs. Be sure the arm cuff is the right size for your arm. Ask someone to measure around your upper arm. If your upper arm is more than 13 inches around, buy a monitor with a large cuff. To get a correct measurement, the cuff needs to be the right size.    It is important to measure your blood pressure periodically at home in between office visits. The readings you obtain during these blood pressure checks are often more valuable than the readings we obtain in clinic. However, it is even more important to check your blood pressure CORRECTLY at home. Follow these tips.    Check your blood pressure in the early morning (before you eat, drink, or take any medicines) and at one other random time of day. The random time of day does not need to be consistent from day to day.  Put the cuff on your arm. Remove clothes that get in the way of the cuff. Don t roll up your sleeve in a way that s tight around your arm. The cord should go toward your hand. Line it up with the middle of your forearm. The Velcro should attach easily on the cuff. If it doesn t reach, you may need a bigger cuff.  Wait 30 minutes if you have just eaten a lot, had a drink with caffeine or alcohol, used tobacco products, or exercised. Use the restroom if you need to. (Needing to go can raise your BP.)  Rest both feet flat on the floor with your back supported. Rest your arm at heart level on a table or the arm of a chair.  Sit quietly for 5 minutes or more before taking your blood pressure. Avoid talking while your blood pressure is being measured.  Start the monitor. Press the  button or squeeze the ball to measure your blood pressure. Write down the time, the measurement, and your pulse.  Wait 2 minutes.  Repeat 2 more times.  Take the average of the readings. That's your blood pressure.    Lastly, bring your home monitor into the office for us to validate! Compare your blood pressure monitor to the standardized method we use. It's a good idea to do this once per machine or if your machine starts giving you odd readings (suddenly much higher or lower than you're used to).

## 2025-03-13 NOTE — PROGRESS NOTES
"Preventive Care Visit  [facility]  David Rodríguez MD, Internal Medicine  Mar 13, 2025    Assessment & Plan   Routine general medical examination at a health care facility  Reviewed PMH. Discussed healthcare maintenance issues, including cancer screenings, relevant immunizations, and cardiac risk factor screenings such as for cholesterol, HTN, and DM. Discussed role of diet in health and encouraged patient to eat more fresh foods + less processed foods. Discussed role of exercise in health and encourage patient to keeping moving their body regularly.    Essential hypertension  BP consistently >140/90. Discussed new guidelines that aim for <130/80. Patient open to starting medication today. Discussed lifestyle interventions such as weight loss and reducing sodium intake in AVS. Will initiate valsartan-amlodipine. Will check labs today. Encouraged to start checking BP at home and to let me know at our 2 week follow-up appointment that I assisted him in scheduling today.  - CBC with Platelets & Differential; Future  - Comprehensive metabolic panel; Future  - amLODIPine-valsartan (EXFORGE) 5-160 MG tablet; Take 1 tablet by mouth daily. REPEAT OFFICE VISIT NEEDED FOR REFILLS    Esophageal stricture  Recommended repeat EGD at the same time as his colonoscopy to screen him for recurrence of his condition.  - Adult GI  Referral - Procedure Only; Future    Encounter for screening for lipid disorder  Fasting lab check today.  - Lipid panel reflex to direct LDL Fasting; Future    Screen for colon cancer  Reports normal colonoscopy ~10 years ago. He was interested in repeating. Order placed.  - Colonoscopy Screening  Referral; Future    Screening for prostate cancer  PSA WNL in past.  - Prostate Specific Antigen Screen; Future    BMI  Estimated body mass index is 29.1 kg/m  as calculated from the following:    Height as of this encounter: 1.93 m (6' 4\").    Weight as of this encounter: 108.5 " kg (239 lb 1.6 oz).     Counseling  Appropriate preventive services were addressed with this patient via screening, questionnaire, or discussion as appropriate for fall prevention, nutrition, physical activity, Tobacco-use cessation, social engagement, weight loss and cognition. Checklist reviewing preventive services available has been given to the patient. Reviewed patient's diet, addressing concerns and/or questions. He is at risk for lack of exercise and has been provided with information to increase physical activity for the benefit of his well-being. The patient was instructed to see the dentist every 6 months. Addressed any concerns about safety while driving.      Signed Electronically by:  David Rodríguez MD, MPH  [facility]  Internal Medicine    Subjective   Cristian is a 64 year old presenting for the following: Physical  We utilized an in-person  for today's visit.    HPI  Cristian presents today for a physical exam. This is the first time I have met Cristian, who has not been seen at our clinic before. We also discussed his blood pressure. He was previously treated with amlodipine for this condition but has been off that medication for months due to being lost to follow-up. He has a history of esophageal stricture that was dilated ~10 years ago. He does sometimes still struggle with steak.    Advance Care Planning Patient does not have a Health Care Directive        3/13/2025   General Health   How would you rate your overall physical health? Excellent   Feel stress (tense, anxious, or unable to sleep) Not at all         3/13/2025   Nutrition   Diet: I don't know         3/13/2025   Exercise   Days per week of moderate/strenous exercise 3 days         3/13/2025   Social Factors   Frequency of gathering with friends or relatives Twice a week   Worry food won't last until get money to buy more No   Food not last or not have enough money for food? No   Do you have  housing? (Housing is defined as stable permanent housing and does not include staying ouside in a car, in a tent, in an abandoned building, in an overnight shelter, or couch-surfing.) No   Are you worried about losing your housing? No   Lack of transportation? No   Unable to get utilities (heat,electricity)? No   Want help with housing or utility concern? No   (!) HOUSING CONCERN PRESENT      3/13/2025   Fall Risk   Fallen 2 or more times in the past year? No    No   Trouble with walking or balance? No    No       Multiple values from one day are sorted in reverse-chronological order          3/13/2025   Activities of Daily Living- Home Safety   Needs help with the following daily activites None of the above   Safety concerns in the home None of the above         3/13/2025   Dental   Dentist two times every year? (!) NO         3/13/2025   Hearing Screening   Hearing concerns? None of the above         3/13/2025   Driving Risk Screening   Patient/family members have concerns about driving (!) YES         3/13/2025   General Alertness/Fatigue Screening   Have you been more tired than usual lately? No         3/13/2025   Urinary Incontinence Screening   Bothered by leaking urine in past 6 months No     Today's PHQ-2 Score:       3/13/2025     9:41 AM   PHQ-2 ( 1999 Pf[employer])   Q1: Little interest or pleasure in doing things 0   Q2: Feeling down, depressed or hopeless 0   PHQ-2 Score 0    Q1: Little interest or pleasure in doing things Not at all   Q2: Feeling down, depressed or hopeless Not at all   PHQ-2 Score 0       Patient-reported         3/13/2025   Substance Use   Alcohol more than 3/day or more than 7/wk No   Do you have a current opioid prescription? No   How severe/bad is pain from 1 to 10? 0/10 (No Pain)   Do you use any other substances recreationally? No     Social History     Tobacco Use    Smoking status: Never    Smokeless tobacco: Never   Vaping Use    Vaping status: Never Used   Substance Use Topics     "Alcohol use: No    Drug use: No         3/13/2025   AAA Screening   Family history of Abdominal Aortic Aneurysm (AAA)? Unsure   Last PSA:   PSA   Date Value Ref Range Status   08/27/2014 0.96 0 - 4 ug/L Final     ASCVD Risk   The ASCVD Risk score (Supriya ARANA, et al., 2019) failed to calculate for the following reasons:    Cannot find a previous HDL lab    Cannot find a previous total cholesterol lab    Reviewed and updated as needed this visit by Provider   Tobacco  Allergies  Meds  Problems  Med Hx  Surg Hx  Fam Hx          Current providers sharing in care for this patient include:  Patient Care Team:  Marianna Colin PA-C as PCP - General (Physician Assistant - Medical)  Marianna Colin PA-C as Assigned PCP  Austen Walton DO as Assigned Musculoskeletal Provider    The following health maintenance items are reviewed in Epic and correct as of today:  Health Maintenance   Topic Date Due    ADVANCE CARE PLANNING  Never done    COLORECTAL CANCER SCREENING  Never done    Pneumococcal Vaccine: 50+ Years (1 of 1 - PCV) Never done    ZOSTER IMMUNIZATION (1 of 2) Never done    LIPID  06/11/2023    BMP  05/02/2024    ANNUAL REVIEW OF HM ORDERS  05/02/2024    INFLUENZA VACCINE (1) 09/01/2024    COVID-19 Vaccine (4 - 2024-25 season) 09/01/2024    HEPATITIS C SCREENING  01/13/2026 (Originally 6/26/1978)    HIV SCREENING  01/13/2026 (Originally 6/26/1975)    MEDICARE ANNUAL WELLNESS VISIT  03/13/2026    FALL RISK ASSESSMENT  03/13/2026    GLUCOSE  05/02/2026    DTAP/TDAP/TD IMMUNIZATION (2 - Td or Tdap) 07/17/2033    RSV VACCINE (1 - 1-dose 75+ series) 06/26/2034    PHQ-2 (once per calendar year)  Completed    HPV IMMUNIZATION  Aged Out    MENINGITIS IMMUNIZATION  Aged Out        Objective    Exam  BP (!) 180/116   Pulse 60   Resp 16   Ht 1.93 m (6' 4\")   Wt 108.5 kg (239 lb 1.6 oz)   SpO2 98%   BMI 29.10 kg/m     Estimated body mass index is 29.1 kg/m  as calculated from the following:    Height as " "of this encounter: 1.93 m (6' 4\").    Weight as of this encounter: 108.5 kg (239 lb 1.6 oz).    Physical Exam  GENERAL: In no distress.  EYES: Conjunctivae/corneas clear. EOMs grossly intact.  HENT: NC/AT, facies symmetric. Neck supplw.  RESP: CTAB. No w/r/r.  CV: RRR, no m/r/g.  GI: NT, ND, without rebound or guarding, no CVA tenderness  MSK: Moves all four extremities freely.  SKIN: No significant ulcers, lesions or rashes on the visualized portions of the skin  NEURO: Alert. Oriented.  PSYCH: Linear thought process. Speech normal rate and volume. No tangential thoughts, hallucinations, or delusions.  "

## 2025-03-15 LAB
CHOLEST SERPL-MCNC: 161 MG/DL
FASTING STATUS PATIENT QL REPORTED: YES
HDLC SERPL-MCNC: 46 MG/DL
LDLC SERPL CALC-MCNC: 104 MG/DL
NONHDLC SERPL-MCNC: 115 MG/DL
PSA SERPL DL<=0.01 NG/ML-MCNC: 2.49 NG/ML (ref 0–4.5)
TRIGL SERPL-MCNC: 56 MG/DL

## 2025-03-17 LAB
ALBUMIN SERPL BCG-MCNC: 4.2 G/DL (ref 3.5–5.2)
ALP SERPL-CCNC: 108 U/L (ref 40–150)
ALT SERPL W P-5'-P-CCNC: 20 U/L (ref 0–70)
ANION GAP SERPL CALCULATED.3IONS-SCNC: 13 MMOL/L (ref 7–15)
AST SERPL W P-5'-P-CCNC: 19 U/L (ref 0–45)
BILIRUB SERPL-MCNC: 0.9 MG/DL
BUN SERPL-MCNC: 20.4 MG/DL (ref 8–23)
CALCIUM SERPL-MCNC: 9.8 MG/DL (ref 8.8–10.4)
CHLORIDE SERPL-SCNC: 106 MMOL/L (ref 98–107)
CREAT SERPL-MCNC: 1.13 MG/DL (ref 0.67–1.17)
EGFRCR SERPLBLD CKD-EPI 2021: 72 ML/MIN/1.73M2
FASTING STATUS PATIENT QL REPORTED: YES
GLUCOSE SERPL-MCNC: 93 MG/DL (ref 70–99)
HCO3 SERPL-SCNC: 22 MMOL/L (ref 22–29)
POTASSIUM SERPL-SCNC: 4.4 MMOL/L (ref 3.4–5.3)
PROT SERPL-MCNC: 7.2 G/DL (ref 6.4–8.3)
SODIUM SERPL-SCNC: 141 MMOL/L (ref 135–145)

## 2025-03-27 ENCOUNTER — OFFICE VISIT (OUTPATIENT)
Dept: INTERNAL MEDICINE | Facility: CLINIC | Age: 65
End: 2025-03-27
Payer: COMMERCIAL

## 2025-03-27 VITALS
DIASTOLIC BLOOD PRESSURE: 86 MMHG | WEIGHT: 241.8 LBS | SYSTOLIC BLOOD PRESSURE: 134 MMHG | RESPIRATION RATE: 16 BRPM | HEART RATE: 68 BPM | HEIGHT: 76 IN | OXYGEN SATURATION: 98 % | BODY MASS INDEX: 29.45 KG/M2

## 2025-03-27 DIAGNOSIS — I10 ESSENTIAL HYPERTENSION: Primary | ICD-10-CM

## 2025-03-27 RX ORDER — AMLODIPINE AND VALSARTAN 5; 160 MG/1; MG/1
1 TABLET ORAL DAILY
Qty: 90 TABLET | Refills: 4 | Status: SHIPPED | OUTPATIENT
Start: 2025-03-27

## 2025-03-27 RX ORDER — CHLORTHALIDONE 25 MG/1
25 TABLET ORAL DAILY
Qty: 90 TABLET | Refills: 0 | Status: CANCELLED | OUTPATIENT
Start: 2025-03-27

## 2025-03-27 NOTE — PROGRESS NOTES
"Assessment & Plan   Essential hypertension  BP now at goal. He is tolerating medication well. Refilled for a year.  - amLODIPine-valsartan (EXFORGE) 5-160 MG tablet; Take 1 tablet by mouth daily.    Signed Electronically by:  David Rodríguez MD, MPH  [facility]  Internal Medicine    The longitudinal plan of care for the diagnosis(es)/condition(s) as documented were addressed during this visit. Due to the added complexity in care, I will continue to support Cristina in the subsequent management and with ongoing continuity of care.    Subjective   Cristian is a 64 year old who presents for HTN follow-up. He is again helpfully accompanied by an in-person . My only other visit with Cristian was a physical 2 weeks ago. At that visit, his BP was 180/116. He was initiated on valsartan-amlodipine. He reports he's been taking this for about 1 week. Tolerating well, denies symptoms.        Objective    /86   Pulse 68   Resp 16   Ht 1.93 m (6' 4\")   Wt 109.7 kg (241 lb 12.8 oz)   SpO2 98%   BMI 29.43 kg/m    Body mass index is 29.43 kg/m .    Physical Exam   GENERAL: alert and in no distress.  EYES: conjunctivae/corneas clear. EOMs grossly intact  HENT: Facies symmetric.  RESP: No iWOB.  MSK: Moves all four extremities freely  SKIN: No significant ulcers, lesions, or rashes on the visualized portions of the skin  NEURO: CN II-XII grossly intact.  "

## (undated) DEVICE — DEVICE SUTURE GRASPER TROCAR CLOSURE 14GA PMITCSG

## (undated) DEVICE — STPL RELOAD REG TISSUE ECHELON 45 X 3.6MM BLUE GST45B

## (undated) DEVICE — SU VICRYL 3-0 SH 27" J316H

## (undated) DEVICE — LINEN TOWEL PACK X5 5464

## (undated) DEVICE — CLIP APPLIER ENDO 5MM M/L LIGAMAX EL5ML

## (undated) DEVICE — ENDO POUCH UNIV RETRIEVAL SYSTEM INZII 10MM CD001

## (undated) DEVICE — GLOVE BIOGEL PI MICRO INDICATOR UNDERGLOVE SZ 6.5 48965

## (undated) DEVICE — SU MONOCRYL 4-0 PS-2 18" UND Y496G

## (undated) DEVICE — SUCTION IRR STRYKERFLOW II W/TIP 250-070-520

## (undated) DEVICE — GLOVE BIOGEL PI MICRO SZ 6.0 48560

## (undated) DEVICE — APPLICATOR VISTASEAL RIGID TIP 35CM VSTL35

## (undated) DEVICE — DRSG GAUZE 4X4" 3033

## (undated) DEVICE — ESU HOLDER LAP INST DISP PURPLE LONG 330MM H-PRO-330

## (undated) DEVICE — RX VISTASEAL FIBRIN SEALANT W/THROMBIN 10ML VST10

## (undated) DEVICE — SYR 30ML SLIP TIP W/O NDL 302833

## (undated) DEVICE — ENDO TROCAR BLUNT TIP KII BALLOON 12X100MM C0R47

## (undated) DEVICE — Device

## (undated) DEVICE — SU VICRYL 0 UR-6 27" J603H

## (undated) DEVICE — DRSG BANDAID 3/4X3"

## (undated) DEVICE — EVAC SYSTEM CLEAR FLOW SC082500

## (undated) DEVICE — SU ETHILON 3-0 FS-1 18" 669H

## (undated) DEVICE — PACK LAP CHOLE SLC15LCFSD

## (undated) DEVICE — DRAIN JACKSON PRATT RESERVOIR 100ML SU130-1305

## (undated) DEVICE — CATH CHOLANGIOGRAM KUMAR CC-019

## (undated) DEVICE — SYR 50ML LL W/O NDL 309653

## (undated) DEVICE — ENDO TROCAR FIRST ENTRY KII FIOS Z-THRD 12X100MM CTF73

## (undated) DEVICE — STPL POWERED ECHELON 45MM PSEE45A

## (undated) DEVICE — DRAIN JACKSON PRATT 19FR ROUND SU130-1325

## (undated) DEVICE — ENDO TROCAR FIRST ENTRY KII FIOS Z-THRD 05X100MM CTF03

## (undated) DEVICE — ENDO TROCAR SLEEVE KII Z-THREADED 05X100MM CTS02

## (undated) DEVICE — SOL NACL 0.9% INJ 1000ML BAG 2B1324X

## (undated) DEVICE — PREP CHLORAPREP 26ML TINTED HI-LITE ORANGE 930815

## (undated) RX ORDER — ACETAMINOPHEN 325 MG/1
TABLET ORAL
Status: DISPENSED
Start: 2023-04-19

## (undated) RX ORDER — PROPOFOL 10 MG/ML
INJECTION, EMULSION INTRAVENOUS
Status: DISPENSED
Start: 2023-04-19

## (undated) RX ORDER — CEFAZOLIN SODIUM/WATER 2 G/20 ML
SYRINGE (ML) INTRAVENOUS
Status: DISPENSED
Start: 2023-04-19

## (undated) RX ORDER — BUPIVACAINE HYDROCHLORIDE 5 MG/ML
INJECTION, SOLUTION EPIDURAL; INTRACAUDAL
Status: DISPENSED
Start: 2023-04-19

## (undated) RX ORDER — FENTANYL CITRATE 50 UG/ML
INJECTION, SOLUTION INTRAMUSCULAR; INTRAVENOUS
Status: DISPENSED
Start: 2023-04-19

## (undated) RX ORDER — ONDANSETRON 2 MG/ML
INJECTION INTRAMUSCULAR; INTRAVENOUS
Status: DISPENSED
Start: 2023-04-19

## (undated) RX ORDER — EPINEPHRINE 1 MG/ML
INJECTION, SOLUTION INTRAMUSCULAR; SUBCUTANEOUS
Status: DISPENSED
Start: 2023-04-19

## (undated) RX ORDER — HYDROMORPHONE HYDROCHLORIDE 1 MG/ML
INJECTION, SOLUTION INTRAMUSCULAR; INTRAVENOUS; SUBCUTANEOUS
Status: DISPENSED
Start: 2023-04-19

## (undated) RX ORDER — DEXAMETHASONE SODIUM PHOSPHATE 4 MG/ML
INJECTION, SOLUTION INTRA-ARTICULAR; INTRALESIONAL; INTRAMUSCULAR; INTRAVENOUS; SOFT TISSUE
Status: DISPENSED
Start: 2023-04-19